# Patient Record
Sex: MALE | Race: WHITE | NOT HISPANIC OR LATINO
[De-identification: names, ages, dates, MRNs, and addresses within clinical notes are randomized per-mention and may not be internally consistent; named-entity substitution may affect disease eponyms.]

---

## 2021-02-05 ENCOUNTER — TRANSCRIPTION ENCOUNTER (OUTPATIENT)
Age: 56
End: 2021-02-05

## 2022-11-04 ENCOUNTER — APPOINTMENT (OUTPATIENT)
Dept: ORTHOPEDIC SURGERY | Facility: CLINIC | Age: 57
End: 2022-11-04

## 2022-11-04 VITALS
BODY MASS INDEX: 32.96 KG/M2 | HEART RATE: 89 BPM | SYSTOLIC BLOOD PRESSURE: 148 MMHG | WEIGHT: 210 LBS | HEIGHT: 67 IN | DIASTOLIC BLOOD PRESSURE: 93 MMHG

## 2022-11-04 DIAGNOSIS — M51.16 INTERVERTEBRAL DISC DISORDERS WITH RADICULOPATHY, LUMBAR REGION: ICD-10-CM

## 2022-11-04 DIAGNOSIS — Z78.9 OTHER SPECIFIED HEALTH STATUS: ICD-10-CM

## 2022-11-04 PROCEDURE — 72100 X-RAY EXAM L-S SPINE 2/3 VWS: CPT

## 2022-11-04 PROCEDURE — 99204 OFFICE O/P NEW MOD 45 MIN: CPT

## 2022-11-04 NOTE — PHYSICAL EXAM
[de-identified] : CONSTITUTIONAL: The patient is a very pleasant individual who is well-nourished and who appears stated age.\par PSYCHIATRIC: The patient is alert and oriented X 3 and in no apparent distress, and participates with orthopedic evaluation well.\par HEAD: Atraumatic and is nonsyndromic in appearance.\par EENT: No visible thyromegaly, EOMI.\par RESPIRATORY: Respiratory rate is regular, not dyspneic on examination.\par LYMPHATICS: There is no inguinal lymphadenopathy\par INTEGUMENTARY: Skin is clean, dry, and intact about the bilateral lower extremities and lumbar spine.\par VASCULAR: There is brisk capillary refill about the bilateral lower extremities.\par NEUROLOGIC: There are no pathologic reflexes. There is no decrease in sensation of the bilateral lower extremities on Wartenberg pinwheel examination. Deep tendon reflexes are well maintained at 2+/4 of the bilateral lower extremities and are symmetric..\par MUSCULOSKELETAL: There is no visible muscular atrophy. Manual motor strength is well maintained in the bilateral lower extremities. Range of motion of lumbar spine is well maintained. The patient ambulates in a non-myelopathic manner. Negative tension sign and straight leg raise bilaterally. Quad extension, ankle dorsiflexion, EHL, plantar flexion, and ankle eversion are well preserved. Normal secondary orthopaedic exam of bilateral hips, greater trochanteric area, knees and ankles exam is highlighted by mechanical low back pain with flexion and extension.  Neurogenic claudication begins to occur with patient standing greater than 1 minute in the office down the left leg. [de-identified] : Lumbar x-rays have been reviewed on today's date shows mild lumbar degenerative disc disease lordosis is maintained 2 views of the lumbar spine were obtained and reviewed on November 4, 2022\par \par Lumbar MRI done at [facility] radiology May 13, 2022 demonstrates L4-L5 moderate posterior disc herniation, extrusion left paracentral.  Moderate facet arthrosis and severe final canal stenosis at L4-L5.  L5-S1 decrease intervertebral disc space.

## 2022-11-04 NOTE — DISCUSSION/SUMMARY
[Surgical risks reviewed] : Surgical risks reviewed [de-identified] : Patient has failed conservative management for his back and leg pain in the form of multiple pain management injections last of which was 1 month ago did not help whatsoever as well as considerable physical therapy home exercises taking anti-inflammatories and Tylenol none of which is helping his pain.  A long discussion was had with the patient regarding Lumbar surgical plan of laminectomy L4, L5, possible posterior instrumented noninstrumented and interbody fusion L4-L5, L5-S1, plan to be finalized after CT scan of the lumbar spine results have been reviewed with the patient.  Patient should follow-up after CT of the lumbar spine is completed.  CT of the lumbar spine is ordered and is medically necessary to delineate levels needed to be included in fusion surgery to decrease back pain.  CT will also be used in order to measure implants and to establish if there is any change in gas formation about additional discs.. Anatomic models, Xrays, CT scans/MRI's were utilized to provide a firm understanding of their surgical plan. Patient is aware that surgery is elective in nature and he/she choosing to proceed with surgery. Risks including adjacent segment disease/need for further surgical management in the future, continued back pain, inability to return to work doing lifting and bending type activities, benefits including decreased pain score, alternatives were discussed and all questions, comments and concerns were encouraged and answered to the patient's satisfaction. The statistical probability of improvement was discussed at length as well as post surgical course. Literature from [employer] spine society was provided to the patient regarding the specific type of surgery as well as a 5 page written surgical consent which the patient will need to sign and return to the office prior to surgical date. Consent forms highlight specific complications related to the complex nature of spinal surgery\par  \par Risks of lumbar surgery include: persistent pain, adjacent segment disease (which will require more surgery in future), dural tears, neurologic injury, and wound healing complications\par  \par Benefits of lumbar surgery include Improved neurologic and pain score\par  \par We also discussed with the patient complications of incisions directly related to obesity, diabetes, previous wound complications or post-surgical wound infections, smoking, neuropathy, and chronic anticoagulation. This risk has been specifically discussed and the patient will discuss modifiable risk factors to be optimized prior to surgical management. A multimodality approach of primary care physician, and medicine subspecialist will be utilized to optimize medical risk factors.\par  \par Patient has stated he does not smoke cigarettes.\par Will return to office after her lumbar CT is completed to discuss final surgical planning.

## 2022-11-04 NOTE — HISTORY OF PRESENT ILLNESS
[de-identified] : 57-year-old male complains of low back pain equal to bilateral lower extremity pain.  Low back pain has been going on approximately 20 years has been worsening through time.  For the last year he has had additional complaint of bilateral lower extremity pain tingling numbness upon walking and standing.  He is unable to walk for greater than 30 minutes at a time.  He works as an  and also drives a truck during work.  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  He had physical therapy approximately 1 year ago for 3 months it did not help whatsoever to help his pain.  He has had multiple pain management injections including epidurals for trigger points and facet injections none of which have helped recently to relieve his pain.  Last injection was approximately 1 month ago did not help at all.  He states that when he started having injections 2 years ago they would help for 3 months or so.\par Medical surgical social family and allergy history was reviewed

## 2022-12-09 ENCOUNTER — APPOINTMENT (OUTPATIENT)
Dept: ORTHOPEDIC SURGERY | Facility: CLINIC | Age: 57
End: 2022-12-09

## 2022-12-09 VITALS
HEART RATE: 105 BPM | BODY MASS INDEX: 32.96 KG/M2 | HEIGHT: 67 IN | DIASTOLIC BLOOD PRESSURE: 85 MMHG | SYSTOLIC BLOOD PRESSURE: 124 MMHG | WEIGHT: 210 LBS

## 2022-12-09 DIAGNOSIS — M48.062 SPINAL STENOSIS, LUMBAR REGION WITH NEUROGENIC CLAUDICATION: ICD-10-CM

## 2022-12-09 DIAGNOSIS — M47.816 SPONDYLOSIS W/OUT MYELOPATHY OR RADICULOPATHY, LUMBAR REGION: ICD-10-CM

## 2022-12-09 PROCEDURE — 99215 OFFICE O/P EST HI 40 MIN: CPT

## 2023-01-04 ENCOUNTER — OUTPATIENT (OUTPATIENT)
Dept: OUTPATIENT SERVICES | Facility: HOSPITAL | Age: 58
LOS: 1 days | End: 2023-01-04
Payer: COMMERCIAL

## 2023-01-04 VITALS
HEART RATE: 79 BPM | TEMPERATURE: 98 F | OXYGEN SATURATION: 98 % | WEIGHT: 211.42 LBS | HEIGHT: 69 IN | RESPIRATION RATE: 17 BRPM | SYSTOLIC BLOOD PRESSURE: 120 MMHG | DIASTOLIC BLOOD PRESSURE: 80 MMHG

## 2023-01-04 DIAGNOSIS — Z91.89 OTHER SPECIFIED PERSONAL RISK FACTORS, NOT ELSEWHERE CLASSIFIED: ICD-10-CM

## 2023-01-04 DIAGNOSIS — M48.062 SPINAL STENOSIS, LUMBAR REGION WITH NEUROGENIC CLAUDICATION: ICD-10-CM

## 2023-01-04 DIAGNOSIS — Z98.890 OTHER SPECIFIED POSTPROCEDURAL STATES: Chronic | ICD-10-CM

## 2023-01-04 DIAGNOSIS — M47.816 SPONDYLOSIS WITHOUT MYELOPATHY OR RADICULOPATHY, LUMBAR REGION: ICD-10-CM

## 2023-01-04 DIAGNOSIS — F31.9 BIPOLAR DISORDER, UNSPECIFIED: ICD-10-CM

## 2023-01-04 DIAGNOSIS — Z01.818 ENCOUNTER FOR OTHER PREPROCEDURAL EXAMINATION: ICD-10-CM

## 2023-01-04 DIAGNOSIS — S68.119A COMPLETE TRAUMATIC METACARPOPHALANGEAL AMPUTATION OF UNSPECIFIED FINGER, INITIAL ENCOUNTER: Chronic | ICD-10-CM

## 2023-01-04 DIAGNOSIS — M48.00 SPINAL STENOSIS, SITE UNSPECIFIED: ICD-10-CM

## 2023-01-04 DIAGNOSIS — I10 ESSENTIAL (PRIMARY) HYPERTENSION: ICD-10-CM

## 2023-01-04 LAB
A1C WITH ESTIMATED AVERAGE GLUCOSE RESULT: 5.8 % — HIGH (ref 4–5.6)
ALBUMIN SERPL ELPH-MCNC: 4.6 G/DL — SIGNIFICANT CHANGE UP (ref 3.3–5.2)
ALP SERPL-CCNC: 60 U/L — SIGNIFICANT CHANGE UP (ref 40–120)
ALT FLD-CCNC: 26 U/L — SIGNIFICANT CHANGE UP
ANION GAP SERPL CALC-SCNC: 9 MMOL/L — SIGNIFICANT CHANGE UP (ref 5–17)
APTT BLD: 27.7 SEC — SIGNIFICANT CHANGE UP (ref 27.5–35.5)
AST SERPL-CCNC: 20 U/L — SIGNIFICANT CHANGE UP
BASOPHILS # BLD AUTO: 0.03 K/UL — SIGNIFICANT CHANGE UP (ref 0–0.2)
BASOPHILS NFR BLD AUTO: 0.5 % — SIGNIFICANT CHANGE UP (ref 0–2)
BILIRUB SERPL-MCNC: 0.4 MG/DL — SIGNIFICANT CHANGE UP (ref 0.4–2)
BLD GP AB SCN SERPL QL: SIGNIFICANT CHANGE UP
BUN SERPL-MCNC: 19.9 MG/DL — SIGNIFICANT CHANGE UP (ref 8–20)
CALCIUM SERPL-MCNC: 9.6 MG/DL — SIGNIFICANT CHANGE UP (ref 8.4–10.5)
CHLORIDE SERPL-SCNC: 102 MMOL/L — SIGNIFICANT CHANGE UP (ref 96–108)
CO2 SERPL-SCNC: 29 MMOL/L — SIGNIFICANT CHANGE UP (ref 22–29)
CREAT SERPL-MCNC: 1.22 MG/DL — SIGNIFICANT CHANGE UP (ref 0.5–1.3)
EGFR: 68 ML/MIN/1.73M2 — SIGNIFICANT CHANGE UP
EOSINOPHIL # BLD AUTO: 0.15 K/UL — SIGNIFICANT CHANGE UP (ref 0–0.5)
EOSINOPHIL NFR BLD AUTO: 2.3 % — SIGNIFICANT CHANGE UP (ref 0–6)
ESTIMATED AVERAGE GLUCOSE: 120 MG/DL — HIGH (ref 68–114)
GLUCOSE SERPL-MCNC: 106 MG/DL — HIGH (ref 70–99)
HCT VFR BLD CALC: 47.2 % — SIGNIFICANT CHANGE UP (ref 39–50)
HGB BLD-MCNC: 15.5 G/DL — SIGNIFICANT CHANGE UP (ref 13–17)
IMM GRANULOCYTES NFR BLD AUTO: 0.6 % — SIGNIFICANT CHANGE UP (ref 0–0.9)
INR BLD: 1.09 RATIO — SIGNIFICANT CHANGE UP (ref 0.88–1.16)
LYMPHOCYTES # BLD AUTO: 1.35 K/UL — SIGNIFICANT CHANGE UP (ref 1–3.3)
LYMPHOCYTES # BLD AUTO: 21.1 % — SIGNIFICANT CHANGE UP (ref 13–44)
MCHC RBC-ENTMCNC: 28.2 PG — SIGNIFICANT CHANGE UP (ref 27–34)
MCHC RBC-ENTMCNC: 32.8 GM/DL — SIGNIFICANT CHANGE UP (ref 32–36)
MCV RBC AUTO: 86 FL — SIGNIFICANT CHANGE UP (ref 80–100)
MONOCYTES # BLD AUTO: 0.57 K/UL — SIGNIFICANT CHANGE UP (ref 0–0.9)
MONOCYTES NFR BLD AUTO: 8.9 % — SIGNIFICANT CHANGE UP (ref 2–14)
MRSA PCR RESULT.: SIGNIFICANT CHANGE UP
NEUTROPHILS # BLD AUTO: 4.26 K/UL — SIGNIFICANT CHANGE UP (ref 1.8–7.4)
NEUTROPHILS NFR BLD AUTO: 66.6 % — SIGNIFICANT CHANGE UP (ref 43–77)
PLATELET # BLD AUTO: 309 K/UL — SIGNIFICANT CHANGE UP (ref 150–400)
POTASSIUM SERPL-MCNC: 3.8 MMOL/L — SIGNIFICANT CHANGE UP (ref 3.5–5.3)
POTASSIUM SERPL-SCNC: 3.8 MMOL/L — SIGNIFICANT CHANGE UP (ref 3.5–5.3)
PROT SERPL-MCNC: 7.3 G/DL — SIGNIFICANT CHANGE UP (ref 6.6–8.7)
PROTHROM AB SERPL-ACNC: 12.7 SEC — SIGNIFICANT CHANGE UP (ref 10.5–13.4)
RBC # BLD: 5.49 M/UL — SIGNIFICANT CHANGE UP (ref 4.2–5.8)
RBC # FLD: 12.4 % — SIGNIFICANT CHANGE UP (ref 10.3–14.5)
S AUREUS DNA NOSE QL NAA+PROBE: SIGNIFICANT CHANGE UP
SARS-COV-2 RNA SPEC QL NAA+PROBE: DETECTED
SODIUM SERPL-SCNC: 140 MMOL/L — SIGNIFICANT CHANGE UP (ref 135–145)
WBC # BLD: 6.4 K/UL — SIGNIFICANT CHANGE UP (ref 3.8–10.5)
WBC # FLD AUTO: 6.4 K/UL — SIGNIFICANT CHANGE UP (ref 3.8–10.5)

## 2023-01-04 PROCEDURE — G0463: CPT

## 2023-01-04 PROCEDURE — 93005 ELECTROCARDIOGRAM TRACING: CPT

## 2023-01-04 PROCEDURE — 93010 ELECTROCARDIOGRAM REPORT: CPT

## 2023-01-04 NOTE — H&P PST ADULT - MUSCULOSKELETAL
details… no calf tenderness/strength 5/5 bilateral upper extremities/strength 5/5 bilateral lower extremities/back exam

## 2023-01-04 NOTE — H&P PST ADULT - HISTORY OF PRESENT ILLNESS
56 y/o male with PMH HTN, HLD, anxiety, bipolar disorder, GERD and spinal stenosis presents to PST with complaints of low back pain x 20 years that is getting progressively worse. States he has been following with pain management, he has had [id number] and different pain medication with no relief. Pain is constant, described as throbbing, radiates down b/l legs, 8/10 in severity at its worst, worse with sitting, driving and in the morning, relieved with hot showers. Denies fevers, chills, nausea or loss of control of bladder or bowels. Patient is scheduled for L4-L5, L5-S1 instrumented fusion interbody and laminectomy L4-L5 on 1/23/23 with Dr. Reyez.  58 y/o male with PMH HTN, HLD, anxiety, bipolar disorder, GERD and spinal stenosis presents to PST with complaints of low back pain x 20 years that is getting progressively worse. States he has been following with pain management, he has had injections and different pain medication with no relief. Pain is constant, described as throbbing, radiates down b/l legs, 8/10 in severity at its worst, worse with sitting, driving and in the morning, relieved with hot showers. Denies fevers, chills, nausea or loss of control of bladder or bowels. Patient is scheduled for L4-L5, L5-S1 instrumented fusion interbody and laminectomy L4-L5 on 1/23/23 with Dr. Reyez.

## 2023-01-04 NOTE — H&P PST ADULT - NSICDXPASTMEDICALHX_GEN_ALL_CORE_FT
PAST MEDICAL HISTORY:  Anxiety     Bipolar disorder     GERD (gastroesophageal reflux disease)     HLD (hyperlipidemia)     HTN (hypertension)     Spinal stenosis

## 2023-01-04 NOTE — H&P PST ADULT - ASSESSMENT
Patient educated on surgical scrub, COVID testing, ERP protocol, Spine book, preadmission instructions, medical clearance, cardiology clearance and day of procedure medications, verbalizes understanding. Pt instructed to stop vitamins/supplements/herbal medications/ASA/NSAIDS for one week prior to surgery and discuss with PMD.    CAPRINI SCORE    AGE RELATED RISK FACTORS                                                             [ ] Age 41-60 years                                            (1 Point)  [ ] Age: 61-74 years                                           (2 Points)                 [ ] Age= 75 years                                                (3 Points)             DISEASE RELATED RISK FACTORS                                                       [ ] Edema in the lower extremities                 (1 Point)                     [ ] Varicose veins                                               (1 Point)                                 [ ] BMI > 25 Kg/m2                                            (1 Point)                                  [ ] Serious infection (ie PNA)                            (1 Point)                     [ ] Lung disease ( COPD, Emphysema)            (1 Point)                                                                          [ ] Acute myocardial infarction                         (1 Point)                  [ ] Congestive heart failure (in the previous month)  (1 Point)         [ ] Inflammatory bowel disease                            (1 Point)                  [ ] Central venous access, PICC or Port               (2 points)       (within the last month)                                                                [ ] Stroke (in the previous month)                        (5 Points)    [ ] Previous or present malignancy                       (2 points)                                                                                                                                                         HEMATOLOGY RELATED FACTORS                                                         [ ] Prior episodes of VTE                                     (3 Points)                     [ ] Positive family history for VTE                      (3 Points)                  [ ] Prothrombin 66278 A                                     (3 Points)                     [ ] Factor V Leiden                                                (3 Points)                        [ ] Lupus anticoagulants                                      (3 Points)                                                           [ ] Anticardiolipin antibodies                              (3 Points)                                                       [ ] High homocysteine in the blood                   (3 Points)                                             [ ] Other congenital or acquired thrombophilia      (3 Points)                                                [ ] Heparin induced thrombocytopenia                  (3 Points)                                        MOBILITY RELATED FACTORS  [ ] Bed rest                                                         (1 Point)  [ ] Plaster cast                                                    (2 points)  [ ] Bed bound for more than 72 hours           (2 Points)    GENDER SPECIFIC FACTORS  [ ] Pregnancy or had a baby within the last month   (1 Point)  [ ] Post-partum < 6 weeks                                   (1 Point)  [ ] Hormonal therapy  or oral contraception   (1 Point)  [ ] History of pregnancy complications              (1 point)  [ ] Unexplained or recurrent              (1 Point)    OTHER RISK FACTORS                                           (1 Point)  [ ] BMI >40, smoking, diabetes requiring insulin, chemotherapy  blood transfusions and length of surgery over 2 hours    SURGERY RELATED RISK FACTORS  [ ]  Section within the last month     (1 Point)  [ ] Minor surgery                                                  (1 Point)  [ ] Arthroscopic surgery                                       (2 Points)  [ ] Planned major surgery lasting more            (2 Points)      than 45 minutes     [ ] Elective hip or knee joint replacement       (5 points)       surgery                                                TRAUMA RELATED RISK FACTORS  [ ] Fracture of the hip, pelvis, or leg                       (5 Points)  [ ] Spinal cord injury resulting in paralysis             (5 points)       (in the previous month)    [ ] Paralysis  (less than 1 month)                             (5 Points)  [ ] Multiple Trauma within 1 month                        (5 Points)    Total Score [        ]    Caprini Score 0-2: Low Risk, NO VTE prophylaxis required for most patients, encourage ambulation  Caprini Score 3-6: Moderate Risk , pharmacologic VTE prophylaxis is indicated for most patients (in the absence of contraindications)  Caprini Score Greater than or =7: High risk, pharmocologic VTE prophylaxis indicated for most patients (in the absence of contraindications)    OPIOID RISK TOOL    AYAN EACH BOX THAT APPLIES AND ADD TOTALS AT THE END    FAMILY HISTORY OF SUBSTANCE ABUSE                 FEMALE         MALE                                                Alcohol                             [  ]1 pt          [  ]3pts                                               Illegal Durgs                     [  ]2 pts        [  ]3pts                                               Rx Drugs                           [  ]4 pts        [  ]4 pts    PERSONAL HISTORY OF SUBSTANCE ABUSE                                                                                          Alcohol                             [  ]3 pts       [  ]3 pts                                               Illegal Drugs                     [  ]4 pts        [  ]4 pts                                               Rx Drugs                           [  ]5 pts        [  ]5 pts    AGE BETWEEN 16-45 YEARS                                      [  ]1 pt         [  ]1 pt    HISTORY OF PREADOLESCENT   SEXUAL ABUSE                                                             [  ]3 pts        [  ]0pts    PSYCHOLOGICAL DISEASE                     ADD, OCD, Bipolar, Schizophrenia        [  ]2 pts         [  ]2 pts                      Depression                                               [  ]1 pt           [  ]1 pt           SCORING TOTAL   (add numbers and type here)              (***)                                     A score of 3 or lower indicated LOW risk for future opioid abuse  A score of 4 to 7 indicated moderate risk for future opioid abuse  A score of 8 or higher indicates a high risk for opioid abuse   56 y/o male with PMH HTN, HLD, anxiety, bipolar disorder, GERD and spinal stenosis presents to PST with complaints of low back pain x 20 years that is getting progressively worse. States he has been following with pain management, he has had injections and different pain medication with no relief. Pain is constant, described as throbbing, radiates down b/l legs, 8/10 in severity at its worst, worse with sitting, driving and in the morning, relieved with hot showers. Denies fevers, chills, nausea or loss of control of bladder or bowels. Patient is scheduled for L4-L5, L5-S1 instrumented fusion interbody and laminectomy L4-L5 on 23 with Dr. Reyez. Patient educated on surgical scrub, COVID testing, ERP protocol, Spine book, preadmission instructions, medical clearance, cardiology clearance and day of procedure medications, verbalizes understanding. Pt instructed to stop vitamins/supplements/herbal medications/ASA/NSAIDS for one week prior to surgery and discuss with PMD.    CAPRINI SCORE    AGE RELATED RISK FACTORS                                                             [ x] Age 41-60 years                                            (1 Point)  [ ] Age: 61-74 years                                           (2 Points)                 [ ] Age= 75 years                                                (3 Points)             DISEASE RELATED RISK FACTORS                                                       [ ] Edema in the lower extremities                 (1 Point)                     [ ] Varicose veins                                               (1 Point)                                 [x ] BMI > 25 Kg/m2                                            (1 Point)                                  [ ] Serious infection (ie PNA)                            (1 Point)                     [ ] Lung disease ( COPD, Emphysema)            (1 Point)                                                                          [ ] Acute myocardial infarction                         (1 Point)                  [ ] Congestive heart failure (in the previous month)  (1 Point)         [ ] Inflammatory bowel disease                            (1 Point)                  [ ] Central venous access, PICC or Port               (2 points)       (within the last month)                                                                [ ] Stroke (in the previous month)                        (5 Points)    [ ] Previous or present malignancy                       (2 points)                                                                                                                                                         HEMATOLOGY RELATED FACTORS                                                         [ ] Prior episodes of VTE                                     (3 Points)                     [ ] Positive family history for VTE                      (3 Points)                  [ ] Prothrombin 09107 A                                     (3 Points)                     [ ] Factor V Leiden                                                (3 Points)                        [ ] Lupus anticoagulants                                      (3 Points)                                                           [ ] Anticardiolipin antibodies                              (3 Points)                                                       [ ] High homocysteine in the blood                   (3 Points)                                             [ ] Other congenital or acquired thrombophilia      (3 Points)                                                [ ] Heparin induced thrombocytopenia                  (3 Points)                                        MOBILITY RELATED FACTORS  [ ] Bed rest                                                         (1 Point)  [ ] Plaster cast                                                    (2 points)  [ ] Bed bound for more than 72 hours           (2 Points)    GENDER SPECIFIC FACTORS  [ ] Pregnancy or had a baby within the last month   (1 Point)  [ ] Post-partum < 6 weeks                                   (1 Point)  [ ] Hormonal therapy  or oral contraception   (1 Point)  [ ] History of pregnancy complications              (1 point)  [ ] Unexplained or recurrent              (1 Point)    OTHER RISK FACTORS                                           (1 Point)  [x ] BMI >40, smoking, diabetes requiring insulin, chemotherapy  blood transfusions and length of surgery over 2 hours    SURGERY RELATED RISK FACTORS  [ ]  Section within the last month     (1 Point)  [ ] Minor surgery                                                  (1 Point)  [ ] Arthroscopic surgery                                       (2 Points)  [x ] Planned major surgery lasting more            (2 Points)      than 45 minutes     [ ] Elective hip or knee joint replacement       (5 points)       surgery                                                TRAUMA RELATED RISK FACTORS  [ ] Fracture of the hip, pelvis, or leg                       (5 Points)  [ ] Spinal cord injury resulting in paralysis             (5 points)       (in the previous month)    [ ] Paralysis  (less than 1 month)                             (5 Points)  [ ] Multiple Trauma within 1 month                        (5 Points)    Total Score [   5     ]    Caprini Score 0-2: Low Risk, NO VTE prophylaxis required for most patients, encourage ambulation  Caprini Score 3-6: Moderate Risk , pharmacologic VTE prophylaxis is indicated for most patients (in the absence of contraindications)  Caprini Score Greater than or =7: High risk, pharmocologic VTE prophylaxis indicated for most patients (in the absence of contraindications)    OPIOID RISK TOOL    AYAN EACH BOX THAT APPLIES AND ADD TOTALS AT THE END    FAMILY HISTORY OF SUBSTANCE ABUSE                 FEMALE         MALE                                                Alcohol                             [  ]1 pt          [  ]3pts                                               Illegal Durgs                     [  ]2 pts        [  ]3pts                                               Rx Drugs                           [  ]4 pts        [  ]4 pts    PERSONAL HISTORY OF SUBSTANCE ABUSE                                                                                          Alcohol                             [  ]3 pts       [x  ]3 pts                                               Illegal Drugs                     [  ]4 pts        [  ]4 pts                                               Rx Drugs                           [  ]5 pts        [  ]5 pts    AGE BETWEEN 16-45 YEARS                                      [  ]1 pt         [  ]1 pt    HISTORY OF PREADOLESCENT   SEXUAL ABUSE                                                             [  ]3 pts        [  ]0pts    PSYCHOLOGICAL DISEASE                     ADD, OCD, Bipolar, Schizophrenia        [  ]2 pts         [  x]2 pts                      Depression                                               [  ]1 pt           [  ]1 pt           SCORING TOTAL   (add numbers and type here)              (*5**)                                     A score of 3 or lower indicated LOW risk for future opioid abuse  A score of 4 to 7 indicated moderate risk for future opioid abuse  A score of 8 or higher indicates a high risk for opioid abuse

## 2023-01-04 NOTE — CHART NOTE - NSCHARTNOTEFT_GEN_A_CORE
Search Terms: david garcia, 1965Search Date: 01/04/2023 14:44:51 PM  The Drug Utilization Report below displays all of the controlled substance prescriptions, if any, that your patient has filled in the last twelve months. The information displayed on this report is compiled from pharmacy submissions to the Department, and accurately reflects the information as submitted by the pharmacies.    This report was requested by: Mervat Yadav | Reference #: [id number]    Others' Prescriptions  Patient Name: David GarciaBirth Date: 1965  Address: [address] [place] [address] [place], [place] [address]: Male  Rx Written	Rx Dispensed	Drug	Quantity	Days Supply	Prescriber Name  11/16/2022	12/10/2022	oxycodone-acetaminophen 5-325 mg tab	90	30	JarrodPancho pantojamood  11/16/2022	11/17/2022	pregabalin 75 mg capsule	90	30	Jarrod, Pancho Vanna  11/08/2022	11/10/2022	oxycodone-acetaminophen 5-325 mg tab	90	30	JarrodPancho Vanna  10/04/2022	10/06/2022	oxycodone-acetaminophen 5-325 mg tab	90	30	Jarrod, Pancho Headmood  08/31/2022	09/01/2022	oxycodone-acetaminophen 5-325 mg tab	90	30	Dwain Bowen D, MD  07/29/2022	07/31/2022	oxycodone-acetaminophen 5-325 mg tab	90	30	Dwain Bowen D, MD  06/30/2022	06/30/2022	oxycodone-acetaminophen 5-325 mg tab	90	30	Jarrod, Pancho Headmood  05/25/2022	05/26/2022	oxycodone-acetaminophen 5-325 mg tab	90	30	Dwain Bowen D, MD  04/19/2022	04/21/2022	oxycodone-acetaminophen 5-325 mg tab	90	30	JarrodPancho pantojamood  03/23/2022	03/24/2022	oxycodone-acetaminophen 5-325 mg tab	90	30	Jarrod, Pancho Headmood  02/22/2022	02/24/2022	oxycodone-acetaminophen 5-325 mg tablet	90	30	Pancho Garay  02/14/2022	02/15/2022	diazepam 5 mg tablet	2	1	Pancho Garay  01/11/2022	01/13/2022	oxycodone-acetaminophen 5-325 mg tab	90	30	Pancho Garay  * - Drugs marked with an asterisk are compound drugs. If the compound drug is made up of more than one controlled substance, then each controlled substance will be a separate row in the table.

## 2023-01-04 NOTE — H&P PST ADULT - ATTENDING COMMENTS
Attending statement:  I have personally seen this patient, and formed a face to face diagnostic evaluation on this patient on this date.  I have reviewed the PA, NP and or Medical/PA student and/or Resident documentation and agree with the history, physical exam and plan of care except if noted otherwise.      Patient presents for a two-level instrumented fusion at L4-5 and L5-S1.  Patient has exhausted conservative care physical therapy injections.  He has a 2 phase complaint first is neurogenic claudication secondary to severe spinal stenosis at 4 5 and moderate to severe at 5 1.  He is also complaining of intractable low back pain secondary to degenerative disc disease vacuum phenomena at 4 5 and 5 1 I have discussed the role of an instrumented fusion interbody's to address back pain as well as a lumbar laminectomy in a Guy fashion to address his neurogenic claudication based upon failed conservative care he wishes to continue with operative management.  Patient is aware of incomplete resolution of signs and symptoms adjacent segment disease revision surgery etc. 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  Based upon failed conservative care patient wishes to proceed

## 2023-01-04 NOTE — H&P PST ADULT - PROBLEM SELECTOR PLAN 1
medical clearance, cardiology clearance pending  Patient is scheduled for L4-L5, L5-S1 instrumented fusion interbody and laminectomy L4-L5 on 1/23/23 with Dr. Reyez.

## 2023-01-17 NOTE — HISTORY OF PRESENT ILLNESS
[Worsening] : worsening [___ yrs] : [unfilled] year(s) ago [10] : a maximum pain level of 10/10 [Constant] : ~He/She~ states the symptoms seem to be constant [Sitting] : worsened by sitting [Walking] : worsened by walking [None] : No relieving factors are noted [de-identified] : Patient presents for surgical conversation with regard to L4-5 and L5-S1 lumbar degenerative disc disease spinal stenosis patient states his low back pain and neurogenic claudication are equal I discussed openly with this patient that this would require a fusion to address back pain as well as a laminectomy to address his neurogenic claudication he again states this is an equal distribution of low back and leg pain.  And he would like both addressed patient states his signs and symptoms are worsening he states he is now taking medication first thing in the morning with intractable low back and sciatic type findings [Ataxia] : no ataxia [Incontinence] : no incontinence [Loss of Dexterity] : good dexterity [de-identified] : Minimal and transient

## 2023-01-17 NOTE — DISCUSSION/SUMMARY
[de-identified] : A long discussion was had with the patient regarding surgical plan. Patient is aware that surgery is elective in nature and is choosing to proceed with surgery. Risks, benefits, alternatives were discussed and all questions, comments and concerns were answered to the patient's satisfaction. The statistical probability of improvement was discussed at length as well as post surgical course.  Literature was provided regarding the specific type of surgery as well as a written surgical consent which the patient will need to sign and return to the office prior to surgical date.  \par If patient is a smoker, discontinuation of smoking was advised and must be accomplished 6-8 weeks prior to surgery date.  Patient was advised that help with quitting smoking is available through [facility] Smoker's Quit Line and phone number/website was provided, or patient can ask assistance from primary care provider.  Elective surgery will not be performed unless patient complies with this policy. \par \par Based upon patient's wish to improve his back pain and neurogenic claudication we are going to present a lumbar instrumented fusion L4-5 and L5-S1 to address back pain.  We also going to perform a laminectomy and a discectomy to help improve his neurogenic claudication.  Based upon the CAT scan results with nitrogen gas formation/clot formation interbody's will be utilized also to address the degenerative disc disease as well as address the foraminal stenosis.  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  A lumbar laminectomy in an isolated fashion will have a statistically higher chance of improving his return to work.\par \par New lumbar is ordered and is medically necessary to delineate any progression of lumbar spondylosis and stenosis, identify any anomalous anatomy that has occurred since last MRI was taken greater than 6 months ago.

## 2023-01-17 NOTE — PHYSICAL EXAM
[Antalgic] : antalgic [de-identified] : CONSTITUTIONAL: The patient is a very pleasant individual who is well-nourished and who appears stated age.\par PSYCHIATRIC: The patient is alert and oriented X 3 and in no apparent distress, and participates with orthopedic evaluation well.\par HEAD: Atraumatic and is nonsyndromic in appearance.\par EENT: No visible thyromegaly, EOMI.\par RESPIRATORY: Respiratory rate is regular, not dyspneic on examination.\par LYMPHATICS: There is no inguinal lymphadenopathy\par INTEGUMENTARY: Skin is clean, dry, and intact about the bilateral lower extremities and lumbar spine.\par VASCULAR: There is brisk capillary refill about the bilateral lower extremities.\par NEUROLOGIC: There are no pathologic reflexes. There is no decrease in sensation of the bilateral lower extremities on Wartenberg pinwheel examination. Deep tendon reflexes are well maintained at 2+/4 of the bilateral lower extremities and are symmetric.  Neurogenic claudication signs and symptoms are present\par MUSCULOSKELETAL: There is no visible muscular atrophy. Manual motor strength is well maintained in the bilateral lower extremities. Range of motion of lumbar spine is well maintained to approximately 50% with apprehension. The patient ambulates in a non-myelopathic manner however antalgic.  Positive tension sign and straight leg raise bilaterally. Quad extension, ankle dorsiflexion, EHL, plantar flexion, and ankle eversion are well preserved. Normal secondary orthopaedic exam of bilateral hips, greater trochanteric area, knees and ankles [de-identified] : Lumbar CAT scans MRIs from [facility] have been reviewed CAT scan shows nitrogen gas formation/clot formation within 4 5 and 5 1.  Lumbar MRI demonstrates severe spinal stenosis at 4 5 and associated lumbar degenerative disc disease at 4 5 and 5 1.  X-rays demonstrate lumbar spondylosis at 4 5 and 5 1

## 2023-01-18 ENCOUNTER — APPOINTMENT (OUTPATIENT)
Dept: MRI IMAGING | Facility: CLINIC | Age: 58
End: 2023-01-18
Payer: COMMERCIAL

## 2023-01-18 PROBLEM — E78.5 HYPERLIPIDEMIA, UNSPECIFIED: Chronic | Status: ACTIVE | Noted: 2023-01-04

## 2023-01-18 PROBLEM — F31.9 BIPOLAR DISORDER, UNSPECIFIED: Chronic | Status: ACTIVE | Noted: 2023-01-04

## 2023-01-18 PROBLEM — M48.00 SPINAL STENOSIS, SITE UNSPECIFIED: Chronic | Status: ACTIVE | Noted: 2023-01-04

## 2023-01-18 PROBLEM — I10 ESSENTIAL (PRIMARY) HYPERTENSION: Chronic | Status: ACTIVE | Noted: 2023-01-04

## 2023-01-18 PROBLEM — F41.9 ANXIETY DISORDER, UNSPECIFIED: Chronic | Status: ACTIVE | Noted: 2023-01-04

## 2023-01-18 PROBLEM — K21.9 GASTRO-ESOPHAGEAL REFLUX DISEASE WITHOUT ESOPHAGITIS: Chronic | Status: ACTIVE | Noted: 2023-01-04

## 2023-01-18 PROCEDURE — 72148 MRI LUMBAR SPINE W/O DYE: CPT

## 2023-01-22 ENCOUNTER — TRANSCRIPTION ENCOUNTER (OUTPATIENT)
Age: 58
End: 2023-01-22

## 2023-01-23 ENCOUNTER — TRANSCRIPTION ENCOUNTER (OUTPATIENT)
Age: 58
End: 2023-01-23

## 2023-01-23 ENCOUNTER — INPATIENT (INPATIENT)
Facility: HOSPITAL | Age: 58
LOS: 2 days | Discharge: HOME | DRG: 455 | End: 2023-01-26
Attending: ORTHOPAEDIC SURGERY | Admitting: ORTHOPAEDIC SURGERY
Payer: COMMERCIAL

## 2023-01-23 ENCOUNTER — APPOINTMENT (OUTPATIENT)
Dept: ORTHOPEDIC SURGERY | Facility: HOSPITAL | Age: 58
End: 2023-01-23

## 2023-01-23 VITALS
TEMPERATURE: 98 F | OXYGEN SATURATION: 100 % | DIASTOLIC BLOOD PRESSURE: 49 MMHG | RESPIRATION RATE: 16 BRPM | HEART RATE: 98 BPM | SYSTOLIC BLOOD PRESSURE: 95 MMHG

## 2023-01-23 DIAGNOSIS — M47.816 SPONDYLOSIS WITHOUT MYELOPATHY OR RADICULOPATHY, LUMBAR REGION: ICD-10-CM

## 2023-01-23 DIAGNOSIS — Z98.890 OTHER SPECIFIED POSTPROCEDURAL STATES: Chronic | ICD-10-CM

## 2023-01-23 DIAGNOSIS — S68.119A COMPLETE TRAUMATIC METACARPOPHALANGEAL AMPUTATION OF UNSPECIFIED FINGER, INITIAL ENCOUNTER: Chronic | ICD-10-CM

## 2023-01-23 LAB
ABO RH CONFIRMATION: SIGNIFICANT CHANGE UP
GLUCOSE BLDC GLUCOMTR-MCNC: 127 MG/DL — HIGH (ref 70–99)
GLUCOSE BLDC GLUCOMTR-MCNC: 130 MG/DL — HIGH (ref 70–99)
GLUCOSE BLDC GLUCOMTR-MCNC: 168 MG/DL — HIGH (ref 70–99)

## 2023-01-23 PROCEDURE — 99222 1ST HOSP IP/OBS MODERATE 55: CPT

## 2023-01-23 PROCEDURE — 63030 LAMOT DCMPRN NRV RT 1 LMBR: CPT | Mod: AS,50,59

## 2023-01-23 PROCEDURE — 63052 LAM FACETC/FRMT ARTHRD LUM 1: CPT

## 2023-01-23 PROCEDURE — 22633 ARTHRD CMBN 1NTRSPC LUMBAR: CPT | Mod: AS

## 2023-01-23 PROCEDURE — 22842 INSERT SPINE FIXATION DEVICE: CPT

## 2023-01-23 PROCEDURE — 22853 INSJ BIOMECHANICAL DEVICE: CPT | Mod: AS

## 2023-01-23 PROCEDURE — 63053 LAM FACTC/FRMT ARTHRD LUM EA: CPT | Mod: AS

## 2023-01-23 PROCEDURE — 22634 ARTHRD CMBN 1NTRSPC EA ADDL: CPT

## 2023-01-23 PROCEDURE — 22633 ARTHRD CMBN 1NTRSPC LUMBAR: CPT

## 2023-01-23 PROCEDURE — 63053 LAM FACTC/FRMT ARTHRD LUM EA: CPT

## 2023-01-23 PROCEDURE — 22842 INSERT SPINE FIXATION DEVICE: CPT | Mod: AS

## 2023-01-23 PROCEDURE — 63030 LAMOT DCMPRN NRV RT 1 LMBR: CPT | Mod: 50,59

## 2023-01-23 PROCEDURE — 22853 INSJ BIOMECHANICAL DEVICE: CPT

## 2023-01-23 PROCEDURE — 22634 ARTHRD CMBN 1NTRSPC EA ADDL: CPT | Mod: AS

## 2023-01-23 PROCEDURE — 63052 LAM FACETC/FRMT ARTHRD LUM 1: CPT | Mod: AS

## 2023-01-23 DEVICE — FLOSEAL FAST PREP 5ML: Type: IMPLANTABLE DEVICE | Status: FUNCTIONAL

## 2023-01-23 DEVICE — STRYKER LITE BIO DELIVERY SYSTEM WITH CANNULA: Type: IMPLANTABLE DEVICE | Status: FUNCTIONAL

## 2023-01-23 DEVICE — GRAFT VITOSIS BIMODAL 10CC: Type: IMPLANTABLE DEVICE | Status: FUNCTIONAL

## 2023-01-23 DEVICE — SCREW BLOCKER BONE XIA: Type: IMPLANTABLE DEVICE | Status: FUNCTIONAL

## 2023-01-23 DEVICE — ROD 70MM: Type: IMPLANTABLE DEVICE | Status: FUNCTIONAL

## 2023-01-23 DEVICE — SCREW SERRATO 6.5X45MM: Type: IMPLANTABLE DEVICE | Status: FUNCTIONAL

## 2023-01-23 DEVICE — SCREW POLY 6.5X50MM: Type: IMPLANTABLE DEVICE | Status: FUNCTIONAL

## 2023-01-23 DEVICE — SPACER PROLIFT EXPAND POST 15 DEG 10X28X8-13MM: Type: IMPLANTABLE DEVICE | Status: FUNCTIONAL

## 2023-01-23 DEVICE — SPACER PROLIFT EXPAND POST 12 DEG 10X28X8-13MM: Type: IMPLANTABLE DEVICE | Status: FUNCTIONAL

## 2023-01-23 DEVICE — CELLERATE SURGICAL POWDER RX 5GM: Type: IMPLANTABLE DEVICE | Status: FUNCTIONAL

## 2023-01-23 RX ORDER — CEFAZOLIN SODIUM 1 G
2000 VIAL (EA) INJECTION
Refills: 0 | Status: COMPLETED | OUTPATIENT
Start: 2023-01-23 | End: 2023-01-24

## 2023-01-23 RX ORDER — HYDROMORPHONE HYDROCHLORIDE 2 MG/ML
0.5 INJECTION INTRAMUSCULAR; INTRAVENOUS; SUBCUTANEOUS
Refills: 0 | Status: DISCONTINUED | OUTPATIENT
Start: 2023-01-23 | End: 2023-01-23

## 2023-01-23 RX ORDER — CARIPRAZINE 1.5 MG/1
1 CAPSULE, GELATIN COATED ORAL
Qty: 0 | Refills: 0 | DISCHARGE

## 2023-01-23 RX ORDER — VENLAFAXINE HCL 75 MG
1 CAPSULE, EXT RELEASE 24 HR ORAL
Qty: 0 | Refills: 0 | DISCHARGE

## 2023-01-23 RX ORDER — SODIUM CHLORIDE 9 MG/ML
1000 INJECTION, SOLUTION INTRAVENOUS
Refills: 0 | Status: DISCONTINUED | OUTPATIENT
Start: 2023-01-23 | End: 2023-01-23

## 2023-01-23 RX ORDER — GLUCAGON INJECTION, SOLUTION 0.5 MG/.1ML
1 INJECTION, SOLUTION SUBCUTANEOUS ONCE
Refills: 0 | Status: DISCONTINUED | OUTPATIENT
Start: 2023-01-23 | End: 2023-01-23

## 2023-01-23 RX ORDER — ATORVASTATIN CALCIUM 80 MG/1
40 TABLET, FILM COATED ORAL AT BEDTIME
Refills: 0 | Status: DISCONTINUED | OUTPATIENT
Start: 2023-01-23 | End: 2023-01-26

## 2023-01-23 RX ORDER — OMEPRAZOLE 10 MG/1
1 CAPSULE, DELAYED RELEASE ORAL
Qty: 0 | Refills: 0 | DISCHARGE

## 2023-01-23 RX ORDER — HYDROMORPHONE HYDROCHLORIDE 2 MG/ML
4 INJECTION INTRAMUSCULAR; INTRAVENOUS; SUBCUTANEOUS
Refills: 0 | Status: DISCONTINUED | OUTPATIENT
Start: 2023-01-23 | End: 2023-01-26

## 2023-01-23 RX ORDER — INSULIN LISPRO 100/ML
VIAL (ML) SUBCUTANEOUS
Refills: 0 | Status: DISCONTINUED | OUTPATIENT
Start: 2023-01-23 | End: 2023-01-26

## 2023-01-23 RX ORDER — ACETAMINOPHEN 500 MG
975 TABLET ORAL EVERY 8 HOURS
Refills: 0 | Status: DISCONTINUED | OUTPATIENT
Start: 2023-01-23 | End: 2023-01-26

## 2023-01-23 RX ORDER — DEXTROSE 50 % IN WATER 50 %
12.5 SYRINGE (ML) INTRAVENOUS ONCE
Refills: 0 | Status: DISCONTINUED | OUTPATIENT
Start: 2023-01-23 | End: 2023-01-23

## 2023-01-23 RX ORDER — ASPIRIN/CALCIUM CARB/MAGNESIUM 324 MG
325 TABLET ORAL DAILY
Refills: 0 | Status: DISCONTINUED | OUTPATIENT
Start: 2023-01-24 | End: 2023-01-26

## 2023-01-23 RX ORDER — ONDANSETRON 8 MG/1
4 TABLET, FILM COATED ORAL ONCE
Refills: 0 | Status: DISCONTINUED | OUTPATIENT
Start: 2023-01-23 | End: 2023-01-23

## 2023-01-23 RX ORDER — SODIUM CHLORIDE 9 MG/ML
1000 INJECTION, SOLUTION INTRAVENOUS
Refills: 0 | Status: DISCONTINUED | OUTPATIENT
Start: 2023-01-23 | End: 2023-01-26

## 2023-01-23 RX ORDER — ENALAPRIL MALEATE AND HYDROCHLOROTHIAZIDE 10; 25 MG/1; MG/1
1 TABLET ORAL
Qty: 0 | Refills: 0 | DISCHARGE

## 2023-01-23 RX ORDER — ATORVASTATIN CALCIUM 80 MG/1
1 TABLET, FILM COATED ORAL
Qty: 0 | Refills: 0 | DISCHARGE

## 2023-01-23 RX ORDER — METHOCARBAMOL 500 MG/1
750 TABLET, FILM COATED ORAL EVERY 8 HOURS
Refills: 0 | Status: DISCONTINUED | OUTPATIENT
Start: 2023-01-23 | End: 2023-01-26

## 2023-01-23 RX ORDER — DEXTROSE 50 % IN WATER 50 %
25 SYRINGE (ML) INTRAVENOUS ONCE
Refills: 0 | Status: DISCONTINUED | OUTPATIENT
Start: 2023-01-23 | End: 2023-01-23

## 2023-01-23 RX ORDER — OXYCODONE HYDROCHLORIDE 5 MG/1
10 TABLET ORAL
Refills: 0 | Status: DISCONTINUED | OUTPATIENT
Start: 2023-01-23 | End: 2023-01-26

## 2023-01-23 RX ORDER — SEMAGLUTIDE 0.68 MG/ML
0 INJECTION, SOLUTION SUBCUTANEOUS
Qty: 0 | Refills: 0 | DISCHARGE

## 2023-01-23 RX ORDER — SENNA PLUS 8.6 MG/1
2 TABLET ORAL AT BEDTIME
Refills: 0 | Status: DISCONTINUED | OUTPATIENT
Start: 2023-01-23 | End: 2023-01-26

## 2023-01-23 RX ORDER — SODIUM CHLORIDE 9 MG/ML
1000 INJECTION, SOLUTION INTRAVENOUS ONCE
Refills: 0 | Status: COMPLETED | OUTPATIENT
Start: 2023-01-23 | End: 2023-01-23

## 2023-01-23 RX ORDER — ONDANSETRON 8 MG/1
4 TABLET, FILM COATED ORAL EVERY 6 HOURS
Refills: 0 | Status: DISCONTINUED | OUTPATIENT
Start: 2023-01-23 | End: 2023-01-26

## 2023-01-23 RX ORDER — OXYCODONE HYDROCHLORIDE 5 MG/1
5 TABLET ORAL
Refills: 0 | Status: DISCONTINUED | OUTPATIENT
Start: 2023-01-23 | End: 2023-01-26

## 2023-01-23 RX ORDER — ONDANSETRON 8 MG/1
1 TABLET, FILM COATED ORAL
Qty: 0 | Refills: 0 | DISCHARGE

## 2023-01-23 RX ORDER — VENLAFAXINE HCL 75 MG
150 CAPSULE, EXT RELEASE 24 HR ORAL DAILY
Refills: 0 | Status: DISCONTINUED | OUTPATIENT
Start: 2023-01-23 | End: 2023-01-26

## 2023-01-23 RX ORDER — DEXTROSE 50 % IN WATER 50 %
15 SYRINGE (ML) INTRAVENOUS ONCE
Refills: 0 | Status: DISCONTINUED | OUTPATIENT
Start: 2023-01-23 | End: 2023-01-23

## 2023-01-23 RX ORDER — MAGNESIUM HYDROXIDE 400 MG/1
30 TABLET, CHEWABLE ORAL EVERY 12 HOURS
Refills: 0 | Status: DISCONTINUED | OUTPATIENT
Start: 2023-01-23 | End: 2023-01-26

## 2023-01-23 RX ADMIN — OXYCODONE HYDROCHLORIDE 10 MILLIGRAM(S): 5 TABLET ORAL at 23:20

## 2023-01-23 RX ADMIN — ATORVASTATIN CALCIUM 40 MILLIGRAM(S): 80 TABLET, FILM COATED ORAL at 22:15

## 2023-01-23 RX ADMIN — SENNA PLUS 2 TABLET(S): 8.6 TABLET ORAL at 22:16

## 2023-01-23 RX ADMIN — OXYCODONE HYDROCHLORIDE 10 MILLIGRAM(S): 5 TABLET ORAL at 17:21

## 2023-01-23 RX ADMIN — OXYCODONE HYDROCHLORIDE 10 MILLIGRAM(S): 5 TABLET ORAL at 22:20

## 2023-01-23 RX ADMIN — OXYCODONE HYDROCHLORIDE 10 MILLIGRAM(S): 5 TABLET ORAL at 18:20

## 2023-01-23 RX ADMIN — Medication 2000 MILLIGRAM(S): at 18:07

## 2023-01-23 RX ADMIN — Medication 975 MILLIGRAM(S): at 23:16

## 2023-01-23 RX ADMIN — Medication 975 MILLIGRAM(S): at 22:16

## 2023-01-23 RX ADMIN — SODIUM CHLORIDE 125 MILLILITER(S): 9 INJECTION, SOLUTION INTRAVENOUS at 17:20

## 2023-01-23 RX ADMIN — SODIUM CHLORIDE 125 MILLILITER(S): 9 INJECTION, SOLUTION INTRAVENOUS at 22:15

## 2023-01-23 RX ADMIN — SODIUM CHLORIDE 2000 MILLILITER(S): 9 INJECTION, SOLUTION INTRAVENOUS at 13:10

## 2023-01-23 RX ADMIN — Medication 2: at 17:28

## 2023-01-23 RX ADMIN — Medication 150 MILLIGRAM(S): at 18:08

## 2023-01-23 RX ADMIN — METHOCARBAMOL 750 MILLIGRAM(S): 500 TABLET, FILM COATED ORAL at 13:28

## 2023-01-23 NOTE — DISCHARGE NOTE PROVIDER - HOSPITAL COURSE
Patient was admitted for elective posterior lumbar laminectomy on 1/23/23 for failed conservative treatment of persistent lower back pain, The hospital post operative course was uneventful.  The surgical dressing was changed and the drain was removed uneventfully.   PT/OT worked with patient for gait training.  A TLSO brace was fitted for patient and was used by the patient when out of bed for comfort. Pain was now adequately controlled and patient was discharged home in stable condition.  Chronic medical conditions were treated during the hospital stay.

## 2023-01-23 NOTE — PHYSICAL THERAPY INITIAL EVALUATION ADULT - ADDITIONAL COMMENTS
Pt reports living with spouse in a house with 2 KATIE c rail, and bedroom on the 2nd level with 12 steps c rail. Pt amb without device and is independent with functional mobility, ADLs, and IADLs. Pt drives and is currently working. Pt has support of family and friends. Pt owns no DME.

## 2023-01-23 NOTE — DISCHARGE NOTE PROVIDER - NSDCMRMEDTOKEN_GEN_ALL_CORE_FT
atorvastatin 40 mg oral tablet: 1 tab(s) orally once a day  diclofenac potassium 50 mg oral tablet: 1 tab(s) orally 2 times a day  enalapril-hydrochlorothiazide 10 mg-25 mg oral tablet: 1 tab(s) orally once a day  omeprazole 20 mg oral delayed release capsule: 1 cap(s) orally once a day  ondansetron 4 mg oral tablet: 1 tab(s) orally every 8 hours, As Needed  oxycodone-acetaminophen 5 mg-325 mg oral tablet: 1 tab(s) orally every 8 hours, As Needed  Ozempic 2 mg/1.5 mL (0.25 mg or 0.5 mg dose) subcutaneous solution: subcutaneous once a week  venlafaxine 150 mg oral tablet, extended release: 1 tab(s) orally once a day  Vraylar 3 mg oral capsule: 1 cap(s) orally once a day   acetaminophen 325 mg oral capsule: 2 cap(s) orally every 6 hours  for mild to moderate pain. Do not exceed 4000 mg in 24 hours.  aspirin 325 mg oral delayed release tablet: 1 tab(s) orally once a day (with meals)   atorvastatin 40 mg oral tablet: 1 tab(s) orally once a day  enalapril-hydrochlorothiazide 10 mg-25 mg oral tablet: 1 tab(s) orally once a day  methocarbamol 750 mg oral tablet: 1 tab(s) orally every 8 hours, As needed for Muscle Spasms.  omeprazole 20 mg oral delayed release capsule: 1 cap(s) orally once a day  ondansetron 4 mg oral tablet: 1 tab(s) orally every 8 hours, As Needed  oxyCODONE 5 mg oral tablet: 1 tab(s) orally every 6 hours, As Needed  for severe pain. MDD:4  Ozempic 2 mg/1.5 mL (0.25 mg or 0.5 mg dose) subcutaneous solution: subcutaneous once a week  Senna S 50 mg-8.6 mg oral tablet: 2 tab(s) orally once a day (at bedtime) .  venlafaxine 150 mg oral tablet, extended release: 1 tab(s) orally once a day  Vraylar 3 mg oral capsule: 1 cap(s) orally once a day

## 2023-01-23 NOTE — CONSULT NOTE ADULT - ASSESSMENT
57M  chronic pain on perocet-5 prn  s/p Lumbar fusion using cage 23-Jan-2023     HYDROmorphone   Tablet 4 milliGRAM(s) Oral every 3 hours PRN  methocarbamol 750 milliGRAM(s) Oral every 8 hours PRN  oxyCODONE    IR 5 milliGRAM(s) Oral every 3 hours PRN  oxyCODONE    IR 10 milliGRAM(s) Oral every 3 hours PRN    if pain becomes uncontrolled:  - If no contraindication to NSAIDs, recommend starting ketorolac IV 30mg [id number] standing x 4 doses. Afterwards, can use celebrex 200mg PO q12h x 7days, with food. HOLD for black/red stools.   - Recommend starting hydromorphone IVP 0.5mg q4hour PRN breakthrough pain

## 2023-01-23 NOTE — PATIENT PROFILE ADULT - FALL HARM RISK - HARM RISK INTERVENTIONS
Assistance with ambulation/Assistance OOB with selected safe patient handling equipment/Communicate Risk of Fall with Harm to all staff/Discuss with provider need for PT consult/Monitor gait and stability/Provide patient with walking aids - walker, cane, crutches/Reinforce activity limits and safety measures with patient and family/Sit up slowly, dangle for a short time, stand at bedside before walking/Tailored Fall Risk Interventions/Use of alarms - bed, chair and/or voice tab/Visual Cue: Yellow wristband and red socks/Bed in lowest position, wheels locked, appropriate side rails in place/Call bell, personal items and telephone in reach/Instruct patient to call for assistance before getting out of bed or chair/Non-slip footwear when patient is out of bed/[place] to call system/Physically safe environment - no spills, clutter or unnecessary equipment/Purposeful Proactive Rounding/Room/bathroom lighting operational, light cord in reach

## 2023-01-23 NOTE — DISCHARGE NOTE PROVIDER - NSDCFUADDINST_GEN_ALL_CORE_FT
Follow-up with Dr. Reyez within 7-10 days. Dressing change daily until dry, then leave dressing in place until office follow-up.  Showering at 48 hour is permitted.  Pain medications as indicated and titrated to patient's needs. Patient will begin aspirin 325mg once daily for 4 weeks post-operatively for clot prevention.  LSO brace as needed for comfort when out of bed. Ambulate with assistance of walker. Contact office if the wound becomes red, opens or discharge increases.  Please make an appointment for follow up with your surgeon in 1-2 weeks. Call to schedule an appointment. Staples or stitches will be removed at your follow up appointment. Keep incision site clean and dry. No creams, lotions, or ointments to incision area. You are cleared to shower 3 days after surgery unless otherwise instructed.    Take pain medication as prescribed after surgery for pain control. Contact your surgeon's office if pain increases while taking prescribed pain medications or if related concerns develop. If you are taking narcotic pain medications, take a stool softener with it to prevent narcotic associated constipation. Additionally, increase water intake (drink at least 8 glasses daily) and add fiber to your diet by eating fruits, vegetables and foods that are rich in grains.     Do NOT take NSAIDs, including ibuprofen, Advil, Aleve, and Motrin for at least 3 months after your surgery as these medications can impact your healing.    NO heavy lifting, strenuous activity, twisting, bending, driving, or working until cleared by your surgeon  Contact your surgeon's office immediately for any of the following: fever, bleeding, new onset numbness/tingling/weakness, nausea and/or vomiting, urinary and/or fecal incontinence or retention. If an emergency occurs (chest pain, shortness of breath, new confusion, seizure, altered mental status, or other), call 911 and go to the emergency department for evaluation.

## 2023-01-23 NOTE — DISCHARGE NOTE PROVIDER - NSDCFUSCHEDAPPT_GEN_ALL_CORE_FT
Rick Reyez Physician [facility]  ORTHOSURG [address]  Scheduled Appointment: 02/02/2023

## 2023-01-23 NOTE — DISCHARGE NOTE PROVIDER - CARE PROVIDER_API CALL
Rick Reyez (DO)  Orthopaedic Surgery  [address], [place] [id number]  Phone: [phone]  Fax: [phone]  Follow Up Time:

## 2023-01-23 NOTE — CONSULT NOTE ADULT - ASSESSMENT
56 y/o male with PMH HTN, HLD, anxiety, bipolar disorder, GERD and spinal stenosis presents to PST with complaints of low back pain x 20 years that is getting progressively worse. States he has been following with pain management, he has had injections and different pain medication with no relief. Pain is constant, described as throbbing, radiates down b/l legs, 8/10 in severity at its worst, worse with sitting, driving and in the morning, relieved with hot showers.    1. Chronic low back pain with radiculopathy due to lumbar spondylosis ,   s/p lumbar fusin using cage , POD#0  PT/OT/pain mgmt  DVT prophylaxis- as per ortho  Abx as per SCIP  Incentive spirometry  Prophylaxis of opioid  induced constipation.  Wound care / HV as per ortho ,   monitor output    2. HTN - BP postop on lower side , continue ivf ,   hold Valsartan / HCTZ for now , monitor - patient is asymptomatic    3. HLD - continue Atorvastatin 40 mg QHS    4. GERD - continue Protonix     5. Hx of Bipolar/ Anxiety - continue Venlafaxine 150 mg daily / Vraylar 3 mg daily -   if not formulary may use own     6. Patient takes Ozempic for weigh loss - lost 8 lb  in 4 wks - may continue on discharge     7. Chronic pain due to low back pain , on chronic opioid use,   pain mgmt appreciated     8. DVT prophylaxis  - as per ortho protocol  Opioid induced constipation  prophylaxis - bowel regimen     Periop Keen + , TOV in am     Thank you for the courtesy of this consult ,   will follow along with you

## 2023-01-23 NOTE — CONSULT NOTE ADULT - SUBJECTIVE AND OBJECTIVE BOX
Patient is a 57y old  Male who presents with a chief complaint of " Back pain " (04 Jan 2023 13:24)      HPI:  56 y/o male with PMH HTN, HLD, anxiety, bipolar disorder, GERD and spinal stenosis presents to PST with complaints of low back pain x 20 years that is getting progressively worse. States he has been following with pain management, he has had injections and different pain medication with no relief. Pain is constant, described as throbbing, radiates down b/l legs, 8/10 in severity at its worst, worse with sitting, driving and in the morning, relieved with hot showers. Denies fevers, chills, nausea or loss of control of bladder or bowels. Patient is scheduled for L4-L5, L5-S1 instrumented fusion interbody and laminectomy L4-L5 on 1/23/23 with Dr. Reyez.  (04 Jan 2023 13:24)        Interval Hx:  Patient seen during rounds in pacu  Patient reports pain to be controlled on current medications  Patient denies sedation with medications     Analgesic Dosing for past 24 hours reviewed as below:    methocarbamol   750 milliGRAM(s) Oral (01-23-23 @ 13:28)          T(C): 36.6 (01-23-23 @ 14:00), Max: 36.6 (01-23-23 @ 13:30)  HR: 90 (01-23-23 @ 14:00) (90 - 105)  BP: 117/68 (01-23-23 @ 14:00) (92/45 - 117/68)  RR: 16 (01-23-23 @ 14:00) (14 - 17)  SpO2: 100% (01-23-23 @ 14:00) (97% - 100%)      01-23-23 @ 07:01  -  01-23-23 @ 14:23  --------------------------------------------------------  IN: 1390 mL / OUT: 245 mL / NET: 1145 mL        ceFAZolin  Injectable. 2000 milliGRAM(s) IV Push <User Schedule>  HYDROmorphone   Tablet 4 milliGRAM(s) Oral every 3 hours PRN  methocarbamol 750 milliGRAM(s) Oral every 8 hours PRN  oxyCODONE    IR 5 milliGRAM(s) Oral every 3 hours PRN  oxyCODONE    IR 10 milliGRAM(s) Oral every 3 hours PRN                  Pain Service   [phone]
    Patient is a 57y old  Male who is s/p lumbar fusion using cage , POD #0 . Seen and examined on med/surg floor ,   tolerated [procedure well .     CC: chronic low back pain with b/l radiculopathy       HPI:  58 y/o male with PMH HTN, HLD, anxiety, bipolar disorder, GERD and spinal stenosis presents to PST with complaints of low back pain x 20 years that is getting progressively worse. States he has been following with pain management, he has had injections and different pain medication with no relief. Pain is constant, described as throbbing, radiates down b/l legs, 8/10 in severity at its worst, worse with sitting, driving and in the morning, relieved with hot showers.    PAST MEDICAL & SURGICAL HISTORY:  HTN (hypertension)      HLD (hyperlipidemia)      GERD (gastroesophageal reflux disease)      Bipolar disorder      Anxiety      Spinal stenosis      H/O carpal tunnel repair      Amputation of finger tip          Social History:  Tobacco - denies   ETOH - drinks 2-3 beers on [place]   Illicit drug abuse - denies    FAMILY HISTORY:  FH: alcoholism (Father)        Allergies    No Known Allergies    Intolerances        HOME MEDICATIONS :     atorvastatin 40 mg oral tablet: 1 tab(s) orally once a day (23 Jan 2023 07:01)  diclofenac potassium 50 mg oral tablet: 1 tab(s) orally 2 times a day (23 Jan 2023 07:01)  enalapril-hydrochlorothiazide 10 mg-25 mg oral tablet: 1 tab(s) orally once a day (23 Jan 2023 07:01)  omeprazole 20 mg oral delayed release capsule: 1 cap(s) orally once a day (23 Jan 2023 07:01)  ondansetron 4 mg oral tablet: 1 tab(s) orally every 8 hours, As Needed (23 Jan 2023 07:01)  oxycodone-acetaminophen 5 mg-325 mg oral tablet: 1 tab(s) orally every 8 hours, As Needed (23 Jan 2023 07:01)  Ozempic 2 mg/1.5 mL (0.25 mg or 0.5 mg dose) subcutaneous solution: subcutaneous once a week (23 Jan 2023 07:01)  venlafaxine 150 mg oral tablet, extended release: 1 tab(s) orally once a day (23 Jan 2023 07:01)  Vraylar 3 mg oral capsule: 1 cap(s) orally once a day (23 Jan 2023 07:01)      REVIEW OF SYSTEMS:    Chronic low back pain with b/l radiculopathy ,   all other systems are reviewed and are negative .     MEDICATIONS  (STANDING):  acetaminophen     Tablet .. 975 milliGRAM(s) Oral every 8 hours  atorvastatin 40 milliGRAM(s) Oral at bedtime  ceFAZolin  Injectable. 2000 milliGRAM(s) IV Push <User Schedule>  dextrose 5%. 1000 milliLiter(s) (100 mL/Hr) IV Continuous <Continuous>  dextrose 5%. 1000 milliLiter(s) (50 mL/Hr) IV Continuous <Continuous>  dextrose 50% Injectable 12.5 Gram(s) IV Push once  dextrose 50% Injectable 25 Gram(s) IV Push once  glucagon  Injectable 1 milliGRAM(s) IntraMuscular once  insulin lispro (ADMELOG) corrective regimen sliding scale   SubCutaneous three times a day before meals  lactated ringers. 1000 milliLiter(s) (125 mL/Hr) IV Continuous <Continuous>  senna 2 Tablet(s) Oral at bedtime  venlafaxine XR. 150 milliGRAM(s) Oral daily    MEDICATIONS  (PRN):  bisacodyl 5 milliGRAM(s) Oral every 12 hours PRN Constipation  dextrose Oral Gel 15 Gram(s) Oral once PRN Blood Glucose LESS THAN 70 milliGRAM(s)/deciliter  HYDROmorphone   Tablet 4 milliGRAM(s) Oral every 3 hours PRN Severe Pain (7 - 10)  magnesium hydroxide Suspension 30 milliLiter(s) Oral every 12 hours PRN Constipation  methocarbamol 750 milliGRAM(s) Oral every 8 hours PRN Muscle Spasm  ondansetron Injectable 4 milliGRAM(s) IV Push every 6 hours PRN Nausea and/or Vomiting  oxyCODONE    IR 5 milliGRAM(s) Oral every 3 hours PRN Mild Pain (1 - 3)  oxyCODONE    IR 10 milliGRAM(s) Oral every 3 hours PRN Moderate Pain (4 - 6)      Vital Signs Last 24 Hrs  T(C): 37 (23 Jan 2023 14:15), Max: 37 (23 Jan 2023 14:15)  T(F): 98.6 (23 Jan 2023 14:15), Max: 98.6 (23 Jan 2023 14:15)  HR: 98 (23 Jan 2023 14:15) (90 - 105)  BP: 96/61 (23 Jan 2023 14:15) (92/45 - 117/68)  BP(mean): 82 (23 Jan 2023 14:00) (57 - 91)  RR: 18 (23 Jan 2023 14:15) (14 - 18)  SpO2: 100% (23 Jan 2023 14:15) (97% - 100%)    Parameters below as of 23 Jan 2023 14:15  Patient On (Oxygen Delivery Method): room air        PHYSICAL EXAM:    GENERAL: NAD, well-groomed, well-developed  HEAD:  Atraumatic, Normocephalic  EYES: EOMI, PERRLA, conjunctiva and sclera clear  NECK: Supple, No JVD, Normal thyroid  NERVOUS SYSTEM:  Alert & Oriented X4, no focal deficit   CHEST/LUNG: CTA  b/l,  no rales, rhonchi, wheezing, or rubs  HEART: Regular rate and rhythm; No murmurs, rubs, or gallops  ABDOMEN: Soft, Nontender, Nondistended; Bowel sounds present  EXTREMITIES:  2+ Peripheral Pulses, No clubbing, cyanosis, or edema ,   LYMPH: No lymphadenopathy noted  SKIN: No rashes or lesions  Low back dressing + , HV+ and draining     LABS:  A1C with Estimated Average Glucose (01.04.23 @ 13:10)    A1C with Estimated Average Glucose Result: 5.8 %    Estimated Average Glucose: 120 mg/dL        RADIOLOGY & ADDITIONAL STUDIES:

## 2023-01-24 LAB
ANION GAP SERPL CALC-SCNC: 9 MMOL/L — SIGNIFICANT CHANGE UP (ref 5–17)
BASOPHILS # BLD AUTO: 0.01 K/UL — SIGNIFICANT CHANGE UP (ref 0–0.2)
BASOPHILS NFR BLD AUTO: 0.1 % — SIGNIFICANT CHANGE UP (ref 0–2)
BUN SERPL-MCNC: 18.4 MG/DL — SIGNIFICANT CHANGE UP (ref 8–20)
CALCIUM SERPL-MCNC: 8.6 MG/DL — SIGNIFICANT CHANGE UP (ref 8.4–10.5)
CHLORIDE SERPL-SCNC: 102 MMOL/L — SIGNIFICANT CHANGE UP (ref 96–108)
CO2 SERPL-SCNC: 27 MMOL/L — SIGNIFICANT CHANGE UP (ref 22–29)
CREAT SERPL-MCNC: 1.12 MG/DL — SIGNIFICANT CHANGE UP (ref 0.5–1.3)
EGFR: 76 ML/MIN/1.73M2 — SIGNIFICANT CHANGE UP
EOSINOPHIL # BLD AUTO: 0 K/UL — SIGNIFICANT CHANGE UP (ref 0–0.5)
EOSINOPHIL NFR BLD AUTO: 0 % — SIGNIFICANT CHANGE UP (ref 0–6)
GLUCOSE BLDC GLUCOMTR-MCNC: 100 MG/DL — HIGH (ref 70–99)
GLUCOSE BLDC GLUCOMTR-MCNC: 101 MG/DL — HIGH (ref 70–99)
GLUCOSE BLDC GLUCOMTR-MCNC: 118 MG/DL — HIGH (ref 70–99)
GLUCOSE SERPL-MCNC: 113 MG/DL — HIGH (ref 70–99)
HCT VFR BLD CALC: 31 % — LOW (ref 39–50)
HGB BLD-MCNC: 10.5 G/DL — LOW (ref 13–17)
IMM GRANULOCYTES NFR BLD AUTO: 0.6 % — SIGNIFICANT CHANGE UP (ref 0–0.9)
LYMPHOCYTES # BLD AUTO: 0.92 K/UL — LOW (ref 1–3.3)
LYMPHOCYTES # BLD AUTO: 8.5 % — LOW (ref 13–44)
MCHC RBC-ENTMCNC: 28.7 PG — SIGNIFICANT CHANGE UP (ref 27–34)
MCHC RBC-ENTMCNC: 33.9 GM/DL — SIGNIFICANT CHANGE UP (ref 32–36)
MCV RBC AUTO: 84.7 FL — SIGNIFICANT CHANGE UP (ref 80–100)
MONOCYTES # BLD AUTO: 1.08 K/UL — HIGH (ref 0–0.9)
MONOCYTES NFR BLD AUTO: 9.9 % — SIGNIFICANT CHANGE UP (ref 2–14)
NEUTROPHILS # BLD AUTO: 8.78 K/UL — HIGH (ref 1.8–7.4)
NEUTROPHILS NFR BLD AUTO: 80.9 % — HIGH (ref 43–77)
PLATELET # BLD AUTO: 179 K/UL — SIGNIFICANT CHANGE UP (ref 150–400)
POTASSIUM SERPL-MCNC: 3.8 MMOL/L — SIGNIFICANT CHANGE UP (ref 3.5–5.3)
POTASSIUM SERPL-SCNC: 3.8 MMOL/L — SIGNIFICANT CHANGE UP (ref 3.5–5.3)
RBC # BLD: 3.66 M/UL — LOW (ref 4.2–5.8)
RBC # FLD: 12.9 % — SIGNIFICANT CHANGE UP (ref 10.3–14.5)
SODIUM SERPL-SCNC: 138 MMOL/L — SIGNIFICANT CHANGE UP (ref 135–145)
WBC # BLD: 10.86 K/UL — HIGH (ref 3.8–10.5)
WBC # FLD AUTO: 10.86 K/UL — HIGH (ref 3.8–10.5)

## 2023-01-24 PROCEDURE — 99232 SBSQ HOSP IP/OBS MODERATE 35: CPT

## 2023-01-24 RX ORDER — CARIPRAZINE 1.5 MG/1
3 CAPSULE, GELATIN COATED ORAL DAILY
Refills: 0 | Status: DISCONTINUED | OUTPATIENT
Start: 2023-01-24 | End: 2023-01-26

## 2023-01-24 RX ADMIN — METHOCARBAMOL 750 MILLIGRAM(S): 500 TABLET, FILM COATED ORAL at 04:46

## 2023-01-24 RX ADMIN — Medication 150 MILLIGRAM(S): at 12:12

## 2023-01-24 RX ADMIN — CARIPRAZINE 3 MILLIGRAM(S): 1.5 CAPSULE, GELATIN COATED ORAL at 11:30

## 2023-01-24 RX ADMIN — Medication 975 MILLIGRAM(S): at 22:14

## 2023-01-24 RX ADMIN — HYDROMORPHONE HYDROCHLORIDE 4 MILLIGRAM(S): 2 INJECTION INTRAMUSCULAR; INTRAVENOUS; SUBCUTANEOUS at 17:50

## 2023-01-24 RX ADMIN — Medication 975 MILLIGRAM(S): at 05:56

## 2023-01-24 RX ADMIN — Medication 975 MILLIGRAM(S): at 21:14

## 2023-01-24 RX ADMIN — SODIUM CHLORIDE 125 MILLILITER(S): 9 INJECTION, SOLUTION INTRAVENOUS at 04:46

## 2023-01-24 RX ADMIN — HYDROMORPHONE HYDROCHLORIDE 4 MILLIGRAM(S): 2 INJECTION INTRAMUSCULAR; INTRAVENOUS; SUBCUTANEOUS at 05:56

## 2023-01-24 RX ADMIN — HYDROMORPHONE HYDROCHLORIDE 4 MILLIGRAM(S): 2 INJECTION INTRAMUSCULAR; INTRAVENOUS; SUBCUTANEOUS at 03:35

## 2023-01-24 RX ADMIN — HYDROMORPHONE HYDROCHLORIDE 4 MILLIGRAM(S): 2 INJECTION INTRAMUSCULAR; INTRAVENOUS; SUBCUTANEOUS at 16:52

## 2023-01-24 RX ADMIN — Medication 325 MILLIGRAM(S): at 11:31

## 2023-01-24 RX ADMIN — HYDROMORPHONE HYDROCHLORIDE 4 MILLIGRAM(S): 2 INJECTION INTRAMUSCULAR; INTRAVENOUS; SUBCUTANEOUS at 06:56

## 2023-01-24 RX ADMIN — Medication 975 MILLIGRAM(S): at 06:57

## 2023-01-24 RX ADMIN — HYDROMORPHONE HYDROCHLORIDE 4 MILLIGRAM(S): 2 INJECTION INTRAMUSCULAR; INTRAVENOUS; SUBCUTANEOUS at 21:15

## 2023-01-24 RX ADMIN — Medication 975 MILLIGRAM(S): at 14:12

## 2023-01-24 RX ADMIN — HYDROMORPHONE HYDROCHLORIDE 4 MILLIGRAM(S): 2 INJECTION INTRAMUSCULAR; INTRAVENOUS; SUBCUTANEOUS at 02:35

## 2023-01-24 RX ADMIN — ATORVASTATIN CALCIUM 40 MILLIGRAM(S): 80 TABLET, FILM COATED ORAL at 21:14

## 2023-01-24 RX ADMIN — Medication 2000 MILLIGRAM(S): at 00:11

## 2023-01-24 RX ADMIN — SENNA PLUS 2 TABLET(S): 8.6 TABLET ORAL at 21:14

## 2023-01-24 RX ADMIN — Medication 975 MILLIGRAM(S): at 15:10

## 2023-01-24 RX ADMIN — HYDROMORPHONE HYDROCHLORIDE 4 MILLIGRAM(S): 2 INJECTION INTRAMUSCULAR; INTRAVENOUS; SUBCUTANEOUS at 22:15

## 2023-01-24 NOTE — PROGRESS NOTE ADULT - SUBJECTIVE AND OBJECTIVE BOX
Patient seen and examined . S/p  , POD # 1. Doing well , c/o mild pain at surgical site , denies n/v , Zayra removed this am ,TOV in progress.     CC : Low back pain well controlled post op with pain meds       MEDICATIONS  (STANDING):  acetaminophen     Tablet .. 975 milliGRAM(s) Oral every 8 hours  aspirin enteric coated 325 milliGRAM(s) Oral daily  atorvastatin 40 milliGRAM(s) Oral at bedtime  cariprazine 3 milliGRAM(s) Oral daily  insulin lispro (ADMELOG) corrective regimen sliding scale   SubCutaneous three times a day before meals  lactated ringers. 1000 milliLiter(s) (125 mL/Hr) IV Continuous <Continuous>  senna 2 Tablet(s) Oral at bedtime  venlafaxine XR. 150 milliGRAM(s) Oral daily    MEDICATIONS  (PRN):  bisacodyl 5 milliGRAM(s) Oral every 12 hours PRN Constipation  HYDROmorphone   Tablet 4 milliGRAM(s) Oral every 3 hours PRN Severe Pain (7 - 10)  magnesium hydroxide Suspension 30 milliLiter(s) Oral every 12 hours PRN Constipation  methocarbamol 750 milliGRAM(s) Oral every 8 hours PRN Muscle Spasm  ondansetron Injectable 4 milliGRAM(s) IV Push every 6 hours PRN Nausea and/or Vomiting  oxyCODONE    IR 5 milliGRAM(s) Oral every 3 hours PRN Mild Pain (1 - 3)  oxyCODONE    IR 10 milliGRAM(s) Oral every 3 hours PRN Moderate Pain (4 - 6)      LABS:                          10.5   10.86 )-----------( 179      ( 24 Jan 2023 05:55 )             31.0     01-24    138  |  102  |  18.4  ----------------------------<  113<H>  3.8   |  27.0  |  1.12    Ca    8.6      24 Jan 2023 05:55    I&O's Detail    23 Jan 2023 07:01  -  24 Jan 2023 07:00  --------------------------------------------------------  IN:    Lactated Ringers: 1000 mL    Lactated Ringers: 150 mL    Lactated Ringers Bolus: 1000 mL    Oral Fluid: 240 mL  Total IN: 2390 mL    OUT:    Accordian (mL): 455 mL    Indwelling Catheter - Urethral (mL): 2990 mL  Total OUT: 3445 mL    Total NET: -1055 mL      24 Jan 2023 07:01  -  24 Jan 2023 12:13  --------------------------------------------------------  IN:  Total IN: 0 mL    OUT:    Accordian (mL): 145 mL    Voided (mL): 350 mL  Total OUT: 495 mL    Total NET: -495 mL    RADIOLOGY & ADDITIONAL TESTS:          REVIEW OF SYSTEMS:    Mild low back pain at surgical site , all other systems are reviewed and are negative .     Vital Signs Last 24 Hrs  T(C): 37 (24 Jan 2023 09:11), Max: 37 (23 Jan 2023 14:15)  T(F): 98.6 (24 Jan 2023 09:11), Max: 98.6 (23 Jan 2023 14:15)  HR: 89 (24 Jan 2023 09:11) (72 - 105)  BP: 115/68 (24 Jan 2023 09:11) (92/45 - 119/73)  BP(mean): 82 (23 Jan 2023 14:00) (57 - 91)  RR: 18 (24 Jan 2023 09:11) (14 - 18)  SpO2: 94% (24 Jan 2023 09:11) (92% - 100%)    Parameters below as of 24 Jan 2023 09:11  Patient On (Oxygen Delivery Method): room air      PHYSICAL EXAM:    GENERAL: NAD, well-groomed, well-developed  HEAD:  Atraumatic, Normocephalic  EYES: EOMI, PERRLA, conjunctiva and sclera clear  NECK: Supple, No JVD, Normal thyroid  NERVOUS SYSTEM:  Alert & Oriented X3, no focal deficit  CHEST/LUNG: CTA b/l ,  no  rales, rhonchi, wheezing, or rubs  HEART: Regular rate and rhythm; No murmurs, rubs, or gallops  ABDOMEN: Soft, Nontender, Nondistended; Bowel sounds present  EXTREMITIES:  2+ Peripheral Pulses, No clubbing, cyanosis, or edema  LYMPH: No lymphadenopathy noted  SKIN: No rashes or lesions  Low back dressing + , HV +   Keen cath out

## 2023-01-24 NOTE — PROGRESS NOTE ADULT - SUBJECTIVE AND OBJECTIVE BOX
Interval Hx:  Patient seen during rounds  Patient reports pain to be controlled on current medications  Patient denies sedation with medications     Analgesic Dosing for past 24 hours reviewed as below:    acetaminophen     Tablet ..   975 milliGRAM(s) Oral (01-24-23 @ 05:56)   975 milliGRAM(s) Oral (01-23-23 @ 22:16)    cariprazine   3 milliGRAM(s) Oral (01-24-23 @ 11:30)    HYDROmorphone   Tablet   4 milliGRAM(s) Oral (01-24-23 @ 05:56)   4 milliGRAM(s) Oral (01-24-23 @ 02:35)    methocarbamol   750 milliGRAM(s) Oral (01-24-23 @ 04:46)   750 milliGRAM(s) Oral (01-23-23 @ 13:28)    oxyCODONE    IR   10 milliGRAM(s) Oral (01-23-23 @ 22:20)   10 milliGRAM(s) Oral (01-23-23 @ 17:21)    venlafaxine XR.   150 milliGRAM(s) Oral (01-24-23 @ 12:12)   150 milliGRAM(s) Oral (01-23-23 @ 18:08)          T(C): 37 (01-24-23 @ 09:11), Max: 37 (01-23-23 @ 14:15)  HR: 89 (01-24-23 @ 09:11) (72 - 105)  BP: 115/68 (01-24-23 @ 09:11) (92/45 - 119/73)  RR: 18 (01-24-23 @ 09:11) (14 - 18)  SpO2: 94% (01-24-23 @ 09:11) (92% - 100%)      01-23-23 @ 07:01  -  01-24-23 @ 07:00  --------------------------------------------------------  IN: 2390 mL / OUT: 3445 mL / NET: -1055 mL    01-24-23 @ 07:01  -  01-24-23 @ 12:53  --------------------------------------------------------  IN: 0 mL / OUT: 495 mL / NET: -495 mL        acetaminophen     Tablet .. 975 milliGRAM(s) Oral every 8 hours  aspirin enteric coated 325 milliGRAM(s) Oral daily  atorvastatin 40 milliGRAM(s) Oral at bedtime  bisacodyl 5 milliGRAM(s) Oral every 12 hours PRN  cariprazine 3 milliGRAM(s) Oral daily  HYDROmorphone   Tablet 4 milliGRAM(s) Oral every 3 hours PRN  insulin lispro (ADMELOG) corrective regimen sliding scale   SubCutaneous three times a day before meals  lactated ringers. 1000 milliLiter(s) IV Continuous <Continuous>  magnesium hydroxide Suspension 30 milliLiter(s) Oral every 12 hours PRN  methocarbamol 750 milliGRAM(s) Oral every 8 hours PRN  ondansetron Injectable 4 milliGRAM(s) IV Push every 6 hours PRN  oxyCODONE    IR 5 milliGRAM(s) Oral every 3 hours PRN  oxyCODONE    IR 10 milliGRAM(s) Oral every 3 hours PRN  senna 2 Tablet(s) Oral at bedtime  venlafaxine XR. 150 milliGRAM(s) Oral daily                          10.5   10.86 )-----------( 179      ( 24 Jan 2023 05:55 )             31.0     01-24    138  |  102  |  18.4  ----------------------------<  113<H>  3.8   |  27.0  |  1.12    Ca    8.6      24 Jan 2023 05:55            Pain Service   [phone]

## 2023-01-24 NOTE — PROGRESS NOTE ADULT - SUBJECTIVE AND OBJECTIVE BOX
MARY TIWARIMANN  [id number]                      SUBJECTIVE: Patient is a 57yMale s/p L4-L5 lami, L4-S1 fusion, POD#1 seen and examined at the bedside. Patient reports pain is controlled with prescribed medication. Patient is participating with PT. Patient denies acute motor or sensory changes. Reports no pre-op paresthesias currently. Denies CP, SOB, dizziness, HA.     OBJECTIVE:   Vital Signs Last 24 Hrs  T(C): 37 (24 Jan 2023 05:03), Max: 37 (23 Jan 2023 14:15)  T(F): 98.6 (24 Jan 2023 05:03), Max: 98.6 (23 Jan 2023 14:15)  HR: 99 (24 Jan 2023 05:03) (72 - 105)  BP: 107/63 (24 Jan 2023 05:03) (92/45 - 119/73)  BP(mean): 82 (23 Jan 2023 14:00) (57 - 91)  RR: 18 (24 Jan 2023 05:03) (14 - 18)  SpO2: 93% (24 Jan 2023 05:03) (92% - 100%)    Parameters below as of 24 Jan 2023 05:03  Patient On (Oxygen Delivery Method): room air    \par                         10.5   10.86 )-----------( 179      ( 24 Jan 2023 05:55 )             31.0       PHYSICAL EXAM:  Constitutional: Alert, awake  Spine:          Dressing: clean/dry/intact           Drains:  drain intact (Output 300cc x 12hrs, 455cc x24hrs)           Sensation:          Lower extremity                         sp         dp         saph       byrd         tibial                                                R          +           +             +           +          +                                               L           +           +             +           +          +                     Motor exam:          Lower extremity                      HF(l2)   KE(l3)    TA(l4)   EHL(l5)  GS(s1)                                                 R        5/5        5/5        5/5       5/5         5/5                                               L         5/5        5/5       5/5       5/5          5/5                                                 B/L LE: warm well perfused; capillary refill <3 seconds. BCR. Calves soft NT          A/P :  57y Male S/P L4-L5 lami, L4-S1 fusion, POD#1    -    Pain control  -    DVT ppx: SCDs,  QD x 28d post-op     -    Abx as per SCIP  -    Monitor Drain Output  -    Resume home meds  -    Physical Therapy  -    Weight bearing status: WBAT

## 2023-01-25 LAB
GLUCOSE BLDC GLUCOMTR-MCNC: 102 MG/DL — HIGH (ref 70–99)
GLUCOSE BLDC GLUCOMTR-MCNC: 115 MG/DL — HIGH (ref 70–99)
GLUCOSE BLDC GLUCOMTR-MCNC: 79 MG/DL — SIGNIFICANT CHANGE UP (ref 70–99)
GLUCOSE BLDC GLUCOMTR-MCNC: 92 MG/DL — SIGNIFICANT CHANGE UP (ref 70–99)
HCT VFR BLD CALC: 30.4 % — LOW (ref 39–50)
HGB BLD-MCNC: 10.5 G/DL — LOW (ref 13–17)
MCHC RBC-ENTMCNC: 29.2 PG — SIGNIFICANT CHANGE UP (ref 27–34)
MCHC RBC-ENTMCNC: 34.5 GM/DL — SIGNIFICANT CHANGE UP (ref 32–36)
MCV RBC AUTO: 84.7 FL — SIGNIFICANT CHANGE UP (ref 80–100)
PLATELET # BLD AUTO: 167 K/UL — SIGNIFICANT CHANGE UP (ref 150–400)
RBC # BLD: 3.59 M/UL — LOW (ref 4.2–5.8)
RBC # FLD: 13 % — SIGNIFICANT CHANGE UP (ref 10.3–14.5)
WBC # BLD: 8.58 K/UL — SIGNIFICANT CHANGE UP (ref 3.8–10.5)
WBC # FLD AUTO: 8.58 K/UL — SIGNIFICANT CHANGE UP (ref 3.8–10.5)

## 2023-01-25 PROCEDURE — 99232 SBSQ HOSP IP/OBS MODERATE 35: CPT

## 2023-01-25 RX ADMIN — CARIPRAZINE 3 MILLIGRAM(S): 1.5 CAPSULE, GELATIN COATED ORAL at 12:11

## 2023-01-25 RX ADMIN — HYDROMORPHONE HYDROCHLORIDE 4 MILLIGRAM(S): 2 INJECTION INTRAMUSCULAR; INTRAVENOUS; SUBCUTANEOUS at 01:46

## 2023-01-25 RX ADMIN — Medication 975 MILLIGRAM(S): at 22:52

## 2023-01-25 RX ADMIN — ATORVASTATIN CALCIUM 40 MILLIGRAM(S): 80 TABLET, FILM COATED ORAL at 22:51

## 2023-01-25 RX ADMIN — OXYCODONE HYDROCHLORIDE 5 MILLIGRAM(S): 5 TABLET ORAL at 17:07

## 2023-01-25 RX ADMIN — Medication 975 MILLIGRAM(S): at 13:48

## 2023-01-25 RX ADMIN — HYDROMORPHONE HYDROCHLORIDE 4 MILLIGRAM(S): 2 INJECTION INTRAMUSCULAR; INTRAVENOUS; SUBCUTANEOUS at 05:13

## 2023-01-25 RX ADMIN — Medication 975 MILLIGRAM(S): at 05:13

## 2023-01-25 RX ADMIN — Medication 975 MILLIGRAM(S): at 14:41

## 2023-01-25 RX ADMIN — Medication 150 MILLIGRAM(S): at 12:02

## 2023-01-25 RX ADMIN — HYDROMORPHONE HYDROCHLORIDE 4 MILLIGRAM(S): 2 INJECTION INTRAMUSCULAR; INTRAVENOUS; SUBCUTANEOUS at 02:31

## 2023-01-25 RX ADMIN — Medication 325 MILLIGRAM(S): at 12:10

## 2023-01-25 RX ADMIN — Medication 10 MILLIGRAM(S): at 05:13

## 2023-01-25 RX ADMIN — OXYCODONE HYDROCHLORIDE 5 MILLIGRAM(S): 5 TABLET ORAL at 18:10

## 2023-01-25 RX ADMIN — HYDROMORPHONE HYDROCHLORIDE 4 MILLIGRAM(S): 2 INJECTION INTRAMUSCULAR; INTRAVENOUS; SUBCUTANEOUS at 22:52

## 2023-01-25 RX ADMIN — HYDROMORPHONE HYDROCHLORIDE 4 MILLIGRAM(S): 2 INJECTION INTRAMUSCULAR; INTRAVENOUS; SUBCUTANEOUS at 23:37

## 2023-01-25 RX ADMIN — SENNA PLUS 2 TABLET(S): 8.6 TABLET ORAL at 22:51

## 2023-01-25 RX ADMIN — Medication 975 MILLIGRAM(S): at 23:40

## 2023-01-25 NOTE — PROGRESS NOTE ADULT - ASSESSMENT
57M  chronic pain on perocet-5 prn  s/p Lumbar fusion using cage 23-Jan-2023     Pain controlled  Pain service will sign off  Please reconsult as needed 
56 y/o male with PMH HTN, HLD, anxiety, bipolar disorder, GERD and spinal stenosis presents to PST with complaints of low back pain x 20 years that is getting progressively worse. States he has been following with pain management, he has had injections and different pain medication with no relief. Pain is constant, described as throbbing, radiates down b/l legs, 8/10 in severity at its worst, worse with sitting, driving and in the morning, relieved with hot showers.    1. Chronic low back pain with radiculopathy due to lumbar spondylosis ,   s/p lumbar fusin using cage , POD#01,  PT/OT/pain mgmt  DVT prophylaxis- as per ortho  Abx as per SCIP- given   Incentive spirometry  Prophylaxis of opioid  induced constipation.  Wound care / HV as per ortho ,   monitor output    2. HTN - BP postop on lower side , this am BP better controlled ,   hold Valsartan / HCTZ for now , restart POD #2 with parameters , monitor - patient is asymptomatic    3. HLD - continue Atorvastatin 40 mg QHS    4. GERD - continue Protonix     5. Hx of Bipolar/ Anxiety - continue Venlafaxine 150 mg daily / Vraylar 3 mg daily -   if not formulary may use own     6. Patient takes Ozempic for weigh loss - lost 8 lb  in 4 wks - may continue on discharge   Prediabetes : preop A1C  5.8 , patient is informed , educated about diet , life style changes   and need to follow up with PMD in 3 months to check A1C and follow further recommendations     7. Chronic pain due to low back pain , on chronic opioid use,   pain mgmt appreciated - continue meds as per recommendations     8. DVT prophylaxis  - as per ortho protocol  Opioid induced constipation  prophylaxis - bowel regimen     Periop Keen  removed  , TOV in  progress     Leucocytosis - no S/S of infection - likely reactive    Anemia - acute blood lost anemia - secondary to surgical blood lost , EBL - 250 ml -   patient is asymptomatic - monitor h/h in am .     Dispo plan is Home - once HV removed .     If HV removed in am and patient HD stable - may discharge .      
58 y/o male with PMH HTN, HLD, anxiety, bipolar disorder, GERD and spinal stenosis presents to PST with complaints of low back pain x 20 years that is getting progressively worse. States he has been following with pain management, he has had injections and different pain medication with no relief. Pain is constant, described as throbbing, radiates down b/l legs, 8/10 in severity at its worst, worse with sitting, driving and in the morning, relieved with hot showers.    1. Chronic low back pain with radiculopathy due to lumbar spondylosis ,   s/p lumbar fusin using cage , POD#02   PT/OT/pain mgmt  DVT prophylaxis- as per ortho  Abx as per SCIP- given   Incentive spirometry  Prophylaxis of opioid  induced constipation.  Wound care / HV as per ortho ,   monitor output    2. HTN - BP postop on lower side , this am BP better controlled ,   hold Valsartan / HCTZ for now , restart POD #2 with parameters , monitor - patient is asymptomatic    3. HLD - continue Atorvastatin 40 mg QHS    4. GERD - continue Protonix     5. Hx of Bipolar/ Anxiety - continue Venlafaxine 150 mg daily / Vraylar 3 mg daily -   if not formulary may use own     6. Obesity - BMI 31 . Patient takes Ozempic for weigh loss - lost 8 lb  in 4 wks - may continue on discharge   Prediabetes : preop A1C  5.8 , patient is informed , educated about diet , life style changes   and need to follow up with PMD in 3 months to check A1C and follow further recommendations     7. Chronic pain due to low back pain , on chronic opioid use,   pain mgmt appreciated - continue meds as per recommendations     8. DVT prophylaxis  - as per ortho protocol- ASA   Opioid induced constipation  prophylaxis - bowel regimen     Periop Keen  removed - voiding with out difficulty     Leucocytosis - no S/S of infection - likely reactive- resolved     Anemia - acute blood lost anemia - secondary to surgical blood lost , EBL - 250 ml - not significant ,   patient is asymptomatic , H/H stable     Dispo plan is Home - once HV removed .      Medically stable to d/c once cleared by physical therapy / ortho once HV removed .   Will sign off , please recall if any medical issue .

## 2023-01-25 NOTE — PROGRESS NOTE ADULT - SUBJECTIVE AND OBJECTIVE BOX
Pt Name: MARY MORA    MRN: [id number]      Patient is a 57 year old male s/p L4-5 laminectomy, POD#2.  Comfortable in bed, no new orthopedic complaints.       PAST MEDICAL & SURGICAL HISTORY:  PAST MEDICAL & SURGICAL HISTORY:  HTN (hypertension)      HLD (hyperlipidemia)      GERD (gastroesophageal reflux disease)      Bipolar disorder      Anxiety      Spinal stenosis      H/O carpal tunnel repair      Amputation of finger tip          Allergies: No Known Allergies      Medications: acetaminophen     Tablet .. 975 milliGRAM(s) Oral every 8 hours  aspirin enteric coated 325 milliGRAM(s) Oral daily  atorvastatin 40 milliGRAM(s) Oral at bedtime  bisacodyl 5 milliGRAM(s) Oral every 12 hours PRN  cariprazine 3 milliGRAM(s) Oral daily  enalapril 10 milliGRAM(s) Oral daily  hydrochlorothiazide 25 milliGRAM(s) Oral daily  HYDROmorphone   Tablet 4 milliGRAM(s) Oral every 3 hours PRN  insulin lispro (ADMELOG) corrective regimen sliding scale   SubCutaneous three times a day before meals  lactated ringers. 1000 milliLiter(s) IV Continuous <Continuous>  magnesium hydroxide Suspension 30 milliLiter(s) Oral every 12 hours PRN  methocarbamol 750 milliGRAM(s) Oral every 8 hours PRN  ondansetron Injectable 4 milliGRAM(s) IV Push every 6 hours PRN  oxyCODONE    IR 5 milliGRAM(s) Oral every 3 hours PRN  oxyCODONE    IR 10 milliGRAM(s) Oral every 3 hours PRN  senna 2 Tablet(s) Oral at bedtime  venlafaxine XR. 150 milliGRAM(s) Oral daily        Ambulation: Walking With Walker                        10.5   8.58  )-----------( 167      ( 2023 05:29 )             30.4     01-    138  |  102  |  18.4  ----------------------------<  113<H>  3.8   |  27.0  |  1.12    Ca    8.6      2023 05:55        PHYSICAL EXAM:    Vital Signs Last 24 Hrs  T(C): 36.9 (2023 05:00), Max: 37.1 (2023 21:51)  T(F): 98.4 (2023 05:00), Max: 98.8 (2023 21:51)  HR: 93 (2023 05:00) (87 - 93)  BP: 133/89 (2023 05:00) (115/68 - 170/88)  BP(mean): --  RR: 18 (2023 05:00) (18 - 18)  SpO2: 95% (2023 05:00) (94% - 98%)    Parameters below as of 2023 05:00  Patient On (Oxygen Delivery Method): room air      Daily     Daily Weight in k (2023 21:51)    Appearance: Alert, responsive, in no acute distress.    Neurological: Sensation is grossly intact to light touch. 5/5 motor function of all extremities. No focal deficits or weaknesses found.    Skin: no rash on visible skin. Skin is clean, dry and intact. No bleeding. No abrasions. No ulcerations. Small area of dried bloody drainainge noted on dressings at drain site.  Drain remains in place. 160 cc output overnight.  Will continue to monitor the drain.     Vascular: 2+ distal pulses. Cap refill < 2 sec. No signs of venous   insufficiency   or stasis. No extremity ulcerations. No cyanosis.    Musculoskeletal:         Bilateral lower extremities 5/5 strength.  EHL/FHL intact.   Cmpartments soft non tender. Palpable distal pulses  A/P:  Pt is a  57y Male s/p L4-5 lumbar laminectomy/ fusion    PLAN:   * WBAT/ PT gait training  Aspirin 325 daily for DVT prophalaxis  Continue to monitor drain output.   Discharge planning to home.

## 2023-01-25 NOTE — PROGRESS NOTE ADULT - SUBJECTIVE AND OBJECTIVE BOX
Patient seen and examined . S/p s/p L4-5 laminectomy , POD # 2. Doing well , pain well controlled , denies n/v ,   voiding without difficulty , participating with [physical therapy .     CC : Low back chronic pain post op well controlled       MEDICATIONS  (STANDING):  acetaminophen     Tablet .. 975 milliGRAM(s) Oral every 8 hours  aspirin enteric coated 325 milliGRAM(s) Oral daily  atorvastatin 40 milliGRAM(s) Oral at bedtime  cariprazine 3 milliGRAM(s) Oral daily  enalapril 10 milliGRAM(s) Oral daily  hydrochlorothiazide 25 milliGRAM(s) Oral daily  insulin lispro (ADMELOG) corrective regimen sliding scale   SubCutaneous three times a day before meals  lactated ringers. 1000 milliLiter(s) (125 mL/Hr) IV Continuous <Continuous>  senna 2 Tablet(s) Oral at bedtime  venlafaxine XR. 150 milliGRAM(s) Oral daily    MEDICATIONS  (PRN):  bisacodyl 5 milliGRAM(s) Oral every 12 hours PRN Constipation  HYDROmorphone   Tablet 4 milliGRAM(s) Oral every 3 hours PRN Severe Pain (7 - 10)  magnesium hydroxide Suspension 30 milliLiter(s) Oral every 12 hours PRN Constipation  methocarbamol 750 milliGRAM(s) Oral every 8 hours PRN Muscle Spasm  ondansetron Injectable 4 milliGRAM(s) IV Push every 6 hours PRN Nausea and/or Vomiting  oxyCODONE    IR 5 milliGRAM(s) Oral every 3 hours PRN Mild Pain (1 - 3)  oxyCODONE    IR 10 milliGRAM(s) Oral every 3 hours PRN Moderate Pain (4 - 6)      LABS:                          10.5   8.58  )-----------( 167      ( 25 Jan 2023 05:29 )             30.4     01-24    138  |  102  |  18.4  ----------------------------<  113<H>  3.8   |  27.0  |  1.12    Ca    8.6      24 Jan 2023 05:55    I&O's Detail    24 Jan 2023 07:01  -  25 Jan 2023 07:00  --------------------------------------------------------  IN:    Oral Fluid: 230 mL  Total IN: 230 mL    OUT:    Accordian (mL): 530 mL    Voided (mL): 1050 mL  Total OUT: 1580 mL    Total NET: -1350 mL                REVIEW OF SYSTEMS:      Vital Signs Last 24 Hrs  T(C): 37.3 (25 Jan 2023 09:15), Max: 37.3 (25 Jan 2023 09:15)  T(F): 99.1 (25 Jan 2023 09:15), Max: 99.1 (25 Jan 2023 09:15)  HR: 99 (25 Jan 2023 09:15) (87 - 99)  BP: 119/85 (25 Jan 2023 09:15) (119/85 - 170/88)  BP(mean): --  RR: 18 (25 Jan 2023 09:15) (18 - 18)  SpO2: 97% (25 Jan 2023 09:15) (95% - 98%)    Parameters below as of 25 Jan 2023 09:15  Patient On (Oxygen Delivery Method): room air      PHYSICAL EXAM:    GENERAL: NAD, well-groomed, well-developed  HEAD:  Atraumatic, Normocephalic  EYES: EOMI, PERRLA, conjunctiva and sclera clear  NECK: Supple, No JVD, Normal thyroid  NERVOUS SYSTEM:  Alert & Oriented X3, no focal deficit, motor 5/5 in all 4 extr, sensation intact   CHEST/LUNG: CTA b/l ,  no  rales, rhonchi, wheezing, or rubs  HEART: Regular rate and rhythm; No murmurs, rubs, or gallops  ABDOMEN: Soft, Nontender, Nondistended; Bowel sounds present  EXTREMITIES:  2+ Peripheral Pulses, No clubbing, cyanosis, or edema  LYMPH: No lymphadenopathy noted  SKIN: No rashes or lesions  Low back dressing + , HV+   Small area of dried bloody drainage noted on dressings at drain site.

## 2023-01-26 ENCOUNTER — TRANSCRIPTION ENCOUNTER (OUTPATIENT)
Age: 58
End: 2023-01-26

## 2023-01-26 VITALS
RESPIRATION RATE: 17 BRPM | TEMPERATURE: 99 F | OXYGEN SATURATION: 98 % | SYSTOLIC BLOOD PRESSURE: 133 MMHG | HEART RATE: 91 BPM | DIASTOLIC BLOOD PRESSURE: 80 MMHG

## 2023-01-26 LAB
GLUCOSE BLDC GLUCOMTR-MCNC: 105 MG/DL — HIGH (ref 70–99)
GLUCOSE BLDC GLUCOMTR-MCNC: 115 MG/DL — HIGH (ref 70–99)

## 2023-01-26 PROCEDURE — 76000 FLUOROSCOPY <1 HR PHYS/QHP: CPT

## 2023-01-26 PROCEDURE — 82962 GLUCOSE BLOOD TEST: CPT

## 2023-01-26 PROCEDURE — 80048 BASIC METABOLIC PNL TOTAL CA: CPT

## 2023-01-26 PROCEDURE — 85027 COMPLETE CBC AUTOMATED: CPT

## 2023-01-26 PROCEDURE — C1889: CPT

## 2023-01-26 PROCEDURE — C9399: CPT

## 2023-01-26 PROCEDURE — 36415 COLL VENOUS BLD VENIPUNCTURE: CPT

## 2023-01-26 PROCEDURE — 85025 COMPLETE CBC W/AUTO DIFF WBC: CPT

## 2023-01-26 PROCEDURE — C1713: CPT

## 2023-01-26 RX ORDER — DICLOFENAC SODIUM 75 MG/1
1 TABLET, DELAYED RELEASE ORAL
Qty: 0 | Refills: 0 | DISCHARGE

## 2023-01-26 RX ORDER — OXYCODONE HYDROCHLORIDE 5 MG/1
1 TABLET ORAL
Qty: 28 | Refills: 0
Start: 2023-01-26 | End: 2023-02-01

## 2023-01-26 RX ORDER — OXYCODONE AND ACETAMINOPHEN 5; 325 MG/1; MG/1
1 TABLET ORAL
Qty: 0 | Refills: 0 | DISCHARGE

## 2023-01-26 RX ORDER — ASPIRIN/CALCIUM CARB/MAGNESIUM 324 MG
1 TABLET ORAL
Qty: 28 | Refills: 0
Start: 2023-01-26 | End: 2023-02-22

## 2023-01-26 RX ORDER — SENNOSIDES/DOCUSATE SODIUM 8.6MG-50MG
2 TABLET ORAL
Qty: 20 | Refills: 0
Start: 2023-01-26 | End: 2023-02-04

## 2023-01-26 RX ORDER — METHOCARBAMOL 500 MG/1
1 TABLET, FILM COATED ORAL
Qty: 21 | Refills: 0
Start: 2023-01-26 | End: 2023-02-01

## 2023-01-26 RX ORDER — ACETAMINOPHEN 500 MG
2 TABLET ORAL
Qty: 80 | Refills: 0
Start: 2023-01-26 | End: 2023-02-04

## 2023-01-26 RX ADMIN — Medication 10 MILLIGRAM(S): at 06:07

## 2023-01-26 RX ADMIN — CARIPRAZINE 3 MILLIGRAM(S): 1.5 CAPSULE, GELATIN COATED ORAL at 11:47

## 2023-01-26 RX ADMIN — Medication 975 MILLIGRAM(S): at 06:30

## 2023-01-26 RX ADMIN — Medication 150 MILLIGRAM(S): at 11:46

## 2023-01-26 RX ADMIN — Medication 975 MILLIGRAM(S): at 06:07

## 2023-01-26 RX ADMIN — Medication 325 MILLIGRAM(S): at 11:47

## 2023-01-26 NOTE — PROGRESS NOTE ADULT - SUBJECTIVE AND OBJECTIVE BOX
ORTHO-SPINE POST-OP PROGRESS NOTE:      [id number]    MARY MORA      PROCEDURE: L4-5 laminectomy and L4-S1 fusion - POD #3.    DOS: 1/23/23.       SUBJECTIVE: Patient seen and examined. Patient reports mild low back discomfort that is controlled by pain medications. Patient denies acute sensory or motor changes. Patient is tolerating diet and has voided post-op. He is participating in bedside PT. No acute orthopedic complaints are expressed at this time. Patient denies fever, chills, chest pain, SOB, abdominal pain, calf pain, numbness, or weakness.                             10.5   8.58  )-----------( 167      ( 25 Jan 2023 05:29 )             30.4               I&O's Detail    25 Jan 2023 07:01  -  26 Jan 2023 07:00  --------------------------------------------------------  IN:  Total IN: 0 mL    OUT:    Accordian (mL): 220 mL  Total OUT: 220 mL    Total NET: -220 mL            acetaminophen     Tablet .. 975 milliGRAM(s) Oral every 8 hours  aspirin enteric coated 325 milliGRAM(s) Oral daily  atorvastatin 40 milliGRAM(s) Oral at bedtime  bisacodyl 5 milliGRAM(s) Oral every 12 hours PRN  cariprazine 3 milliGRAM(s) Oral daily  enalapril 10 milliGRAM(s) Oral daily  hydrochlorothiazide 25 milliGRAM(s) Oral daily  HYDROmorphone   Tablet 4 milliGRAM(s) Oral every 3 hours PRN  insulin lispro (ADMELOG) corrective regimen sliding scale   SubCutaneous three times a day before meals  lactated ringers. 1000 milliLiter(s) IV Continuous <Continuous>  magnesium hydroxide Suspension 30 milliLiter(s) Oral every 12 hours PRN  methocarbamol 750 milliGRAM(s) Oral every 8 hours PRN  ondansetron Injectable 4 milliGRAM(s) IV Push every 6 hours PRN  oxyCODONE    IR 5 milliGRAM(s) Oral every 3 hours PRN  oxyCODONE    IR 10 milliGRAM(s) Oral every 3 hours PRN  senna 2 Tablet(s) Oral at bedtime  venlafaxine XR. 150 milliGRAM(s) Oral daily        T(C): 37 (01-26-23 @ 05:00), Max: 37.4 (01-25-23 @ 20:47)  HR: 94 (01-26-23 @ 05:00) (87 - 96)  BP: 120/86 (01-26-23 @ 05:00) (120/86 - 151/81)  RR: 18 (01-26-23 @ 05:00) (18 - 18)  SpO2: 97% (01-26-23 @ 05:00) (93% - 97%)  Wt(kg): --      PHYSICAL EXAM:     Constitutional: Alert, responsive, in no acute distress.     Abdominal: soft and supple, non distended      Spine:          Dressing: Clean/dry/intact WITH DRAIN NOTED. No active bleeding or discharge noted.            Drains: present with OUTPUT of 150 mL serosanguinous fluid.            Sensation: L2-S1 normal to light touch bilaterally. No focal deficits noted of the lower extremities.                Motor exam: 5/5 hip flexion, knee flexion and ankle plantar and dorsiflexion bilaterally. No focal weaknesses noted.                                                        Calves supple/nontender.                                                       A/P: 69y Male S/P lumbar laminectomy and fusion - POD#3.      -  Pain control as needed.  - Diet/GI PPx.  - Incentive john.   -  DVT ppx: ASA/SCDs.   -  PT.  -  Weight bearing status: WBAT.  -  Dispo planning.   - Monitor drain.  - case d/w Dr. Reyez.

## 2023-01-26 NOTE — DISCHARGE NOTE NURSING/CASE MANAGEMENT/SOCIAL WORK - NSDCPEFALRISK_GEN_ALL_CORE
For information on Fall & Injury Prevention, visit: [email or web address] OR  [email or web address] OR  https://www.cdc.gov/steadi/patient.html

## 2023-01-26 NOTE — DISCHARGE NOTE NURSING/CASE MANAGEMENT/SOCIAL WORK - PATIENT PORTAL LINK FT
You can access the FollowMyHealth Patient Portal offered by [facility] by registering at the following website: [email or web address]. By joining [facility]’s FollowMyHealth portal, you will also be able to view your health information using other applications (apps) compatible with our system.

## 2023-01-31 NOTE — CDI QUERY NOTE - NSCDIOTHERTXTBX_GEN_ALL_CORE_HH
1/17 HIE document- History of Present Illness   Patient presents for surgical conversation with regard to L4-5 and L5-S1 lumbar degenerative disc disease spinal stenosis patient states his low back pain and neurogenic claudication are equal I discussed openly with this patient that this would require a fusion to address back pain as well as a laminectomy to address his neurogenic claudication he again states this is an equal distribution of low back and leg pain.     1/23 OR report- POSTOPERATIVE DIAGNOSES:    Lumbar spondylosis L4-L5 and L5-S1.  Lumbar stenosis with neurogenic claudication.    OPERATION:    Lumbar instrumented fusion with pedicle screw/posterior instrumented fusion at L4, L5, and S1 for two motion segments.  Placement of interbody biomechanical graft with arthrodesis and two motion segments L45 and L5S1.  Complete lumbar laminectomy in a Guy fashion at L4, L5 with complete removal of the inferior facets.  Removal approximately 50% of the spinous process and lamina of L3 with bilateral hemilaminotomies.  Insertion of biomechanical interbody/biomechanical graft with arthrodesis L4-5, L5-S1.  Posterolateral fusion with allograft and autograft across two motion segments L4, L5, and S1     This provided direct access to the nerve root for complete decompression as well as access to the disc space for placement of interbody biomechanical graft with arthrodesis.  All cauda quinine/lumbar sacral neurologic elements were very satisfactorily decompressed at this point in time.     Is there a diagnosis associated with the lumbar laminectomy at L4 and L5?    -Lumbar laminectomy at L4 and L5 was performed for suspected cauda equina compression  -The patient did not have cauda equina compression  -Other  -Not clinically significant

## 2023-02-02 ENCOUNTER — APPOINTMENT (OUTPATIENT)
Dept: ORTHOPEDIC SURGERY | Facility: CLINIC | Age: 58
End: 2023-02-02
Payer: COMMERCIAL

## 2023-02-02 PROCEDURE — 72100 X-RAY EXAM L-S SPINE 2/3 VWS: CPT

## 2023-02-02 PROCEDURE — 99024 POSTOP FOLLOW-UP VISIT: CPT

## 2023-02-02 NOTE — HISTORY OF PRESENT ILLNESS
[3] : the patient reports pain that is 3/10 in severity [Chills] : no chills [Constipation] : no constipation [Diarrhea] : no diarrhea [Dysuria] : no dysuria [Fever] : no fever [Nausea] : no nausea [Vomiting] : no vomiting [de-identified] : Lumbar instrumented fusion with pedicle screw/posterior instrumented fusion at L4, L5, and S1 for two motion segments.\par  Placement of interbody biomechanical graft with arthrodesis and two motion segments L45 and L5S1.\par  Complete lumbar laminectomy in a Guy fashion at L4, L5 with complete removal of the inferior facets.\par  Removal approximately 50% of the spinous process and lamina of L3 with bilateral hemilaminotomies.\par  Insertion of biomechanical interbody/biomechanical graft with arthrodesis L4-5, L5-S1.\par  Posterolateral fusion with allograft and autograft across two motion segments L4, L5, and S1\par DOS: 01/23/2023 [de-identified] : Patient is a little more than 1 week status post two-level lumbar laminectomy and fusion.  He has some discomfort of his low back but it is getting better every day.  Lower extremities he has no more neurogenic claudication type symptoms and is very happy with the outcome of his surgery.  Denies fever chills night sweats or decrease in appetite.  He is taking only Tylenol as needed for pain is not taking any narcotics.  He is not taking muscle relaxants.  He would like to know if he can drive.  No incisional complaints.  He is accompanied by his wife. [de-identified] : CONSTITUTIONAL: The patient is a very pleasant individual who is well-nourished and who appears stated age.  He is ambulating without an assistive device.\par PSYCHIATRIC: The patient is alert and oriented X 3 and in no apparent distress, and participates with orthopedic evaluation well.\par HEAD: Atraumatic and is nonsyndromic in appearance.\par EENT: No visible thyromegaly, EOMI.\par RESPIRATORY: Respiratory rate is regular, not dyspneic on examination.\par LYMPHATICS: There is no inguinal lymphadenopathy\par INTEGUMENTARY: Skin is clean, dry, and intact about the bilateral lower extremities and lumbar spine.  Lumbar incision is healing well without any erythema discharge calor rubor.  Dressing was removed cleansed with chlorhexidine reinforced with Dermabond Steri-Strips and a dry sterile dressing was placed.\par VASCULAR: There is brisk capillary refill about the bilateral lower extremities.\par NEUROLOGIC: There are no pathologic reflexes. There is no decrease in sensation of the bilateral lower extremities on Wartenberg pinwheel examination.\par MUSCULOSKELETAL: There is no visible muscular atrophy. Manual motor strength is well maintained in the bilateral lower extremities.  She is ambulating freely. [de-identified] : X-ray done today AP lateral of the lumbar spine shows a two-level lumbar laminectomy and fusion construct without any acute spondylolisthesis above or below the construct. [de-identified] : Status post lumbar laminectomy and fusion doing well [de-identified] : Light aerobic activity is encouraged.  Refrain from repetitive bending lifting or twisting.  Incisional care was discussed at length.  He can shower from the front until postop week 3.  He should continue aspirin 325 mg once daily for 28 days postoperatively after which  time he can reduce to baby aspirin.  Heat, topicals, ice as needed pain.  Tylenol as needed pain.  Return to office in 3 weeks or as needed for repeat clinical exam.

## 2023-03-03 ENCOUNTER — APPOINTMENT (OUTPATIENT)
Dept: ORTHOPEDIC SURGERY | Facility: CLINIC | Age: 58
End: 2023-03-03
Payer: COMMERCIAL

## 2023-03-03 PROCEDURE — 72100 X-RAY EXAM L-S SPINE 2/3 VWS: CPT

## 2023-03-03 PROCEDURE — 99024 POSTOP FOLLOW-UP VISIT: CPT

## 2023-03-03 NOTE — HISTORY OF PRESENT ILLNESS
[___ Weeks Post Op] : [unfilled] weeks post op [1] : the patient reports pain that is 1/10 in severity [Clean/Dry/Intact] : clean, dry and intact [Healed] : healed [Neuro Intact] : an unremarkable neurological exam [Vascular Intact] : ~T peripheral vascular exam normal [Negative Eriberto's] : maneuvers demonstrated a negative Eriberto's sign [Xray (Date:___)] : [unfilled] Xray -  [Chills] : no chills [Fever] : no fever [Erythema] : not erythematous [Discharge] : absent of discharge [Swelling] : not swollen [Dehiscence] : not dehisced [Doing Well] : is doing well [No Sign of Infection] : is showing no signs of infection [Adequate Pain Control] : has adequate pain control [de-identified] :  Lumbar instrumented fusion with pedicle screw/posterior instrumented fusion at L4, L5, and S1 for two motion segments.\par  Placement of interbody biomechanical graft with arthrodesis and two motion segments L45 and L5S1.\par  Complete lumbar laminectomy in a Guy fashion at L4, L5 with complete removal of the inferior facets.\par  Removal approximately 50% of the spinous process and lamina of L3 with bilateral hemilaminotomies.\par  Insertion of biomechanical interbody/biomechanical graft with arthrodesis L4-5, L5-S1.\par  Posterolateral fusion with allograft and autograft across two motion segments L4, L5, and S1\par DOS: 01/23/2023 [de-identified] : Patient is approximately 5 weeks status post two-level lumbar instrumented fusion at 45 51.  Presents for postop follow-up.  Patient states neurologically he is completely improved very mild mechanically orientated so postoperative pain but otherwise very happy with his outcome. 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

## 2023-03-31 ENCOUNTER — APPOINTMENT (OUTPATIENT)
Dept: ORTHOPEDIC SURGERY | Facility: CLINIC | Age: 58
End: 2023-03-31
Payer: COMMERCIAL

## 2023-03-31 PROCEDURE — 99024 POSTOP FOLLOW-UP VISIT: CPT

## 2023-03-31 PROCEDURE — 72100 X-RAY EXAM L-S SPINE 2/3 VWS: CPT

## 2023-03-31 NOTE — HISTORY OF PRESENT ILLNESS
[___ Months Post Op] : [unfilled] months post op [1] : the patient reports pain that is 1/10 in severity [Fever] : no fever [Clean/Dry/Intact] : clean, dry and intact [Healed] : healed [Erythema] : not erythematous [Discharge] : absent of discharge [Swelling] : not swollen [Dehiscence] : not dehisced [Neuro Intact] : an unremarkable neurological exam [Vascular Intact] : ~T peripheral vascular exam normal [Negative Eriberto's] : maneuvers demonstrated a negative Eriberto's sign [de-identified] :  Lumbar instrumented fusion with pedicle screw/posterior instrumented fusion at L4, L5, and S1 for two motion segments.\par  Placement of interbody biomechanical graft with arthrodesis and two motion segments L45 and L5S1.\par  Complete lumbar laminectomy in a Guy fashion at L4, L5 with complete removal of the inferior facets.\par  Removal approximately 50% of the spinous process and lamina of L3 with bilateral hemilaminotomies.\par  Insertion of biomechanical interbody/biomechanical graft with arthrodesis L4-5, L5-S1.\par  Posterolateral fusion with allograft and autograft across two motion segments L4, L5, and S1\par DOS: 01/23/2023 [de-identified] : Patient is 2 months postop he states his neurogenic claudication is completely resolved states he does have some mechanically orientated low back pain overall he states is about 80% improvement [de-identified] : Patient is doing well no acute neurologic Endor sensory/motor deficits overall very happy with his outcome [de-identified] : X-rays of the lumbar spine 2 views taken on today's date of March 31, 2023 demonstrates well-positioned implants at 4 5 and 5 1. 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 this coming Monday slow purposeful movements lumbar brace is recommended.  Patient is going to follow-up in 2 months recommending core strengthening topical modalities and aerobic conditioning

## 2023-04-19 NOTE — PHYSICAL THERAPY INITIAL EVALUATION ADULT - SIT-TO-STAND BALANCE
Protopic Pregnancy And Lactation Text: This medication is Pregnancy Category C. It is unknown if this medication is excreted in breast milk when applied topically. fair minus

## 2023-06-09 ENCOUNTER — APPOINTMENT (OUTPATIENT)
Dept: ORTHOPEDIC SURGERY | Facility: CLINIC | Age: 58
End: 2023-06-09

## 2023-07-11 DIAGNOSIS — Z86.39 PERSONAL HISTORY OF OTHER ENDOCRINE, NUTRITIONAL AND METABOLIC DISEASE: ICD-10-CM

## 2023-07-11 DIAGNOSIS — I77.9 DISORDER OF ARTERIES AND ARTERIOLES, UNSPECIFIED: ICD-10-CM

## 2023-07-11 DIAGNOSIS — I65.29 OCCLUSION AND STENOSIS OF UNSPECIFIED CAROTID ARTERY: ICD-10-CM

## 2023-07-21 NOTE — ASU PATIENT PROFILE, ADULT - NS SC CAGE ALCOHOL CUT DOWN
TRANSPLANT NEPHROLOGY CONSULT    Chief Complaint   Evaluate patient for potential combined heart / kidney transplant  No chief complaint on file.    History of Present Illness   60 year old  female, with hist of biventricular heart failure s/p ICD w/ RA lead fracture requiring RA/RV lead extraction c/b RV lead perforation requiring repeat extraction and replacement, pAfib s/p ablation, h/o VT s/p DCCT and ablation who was admitted on 7/16 from [facility] for evaluation of advanced heart therapies. Denies any known CKD. Does report in 2014, needing a few sessions of dialysis for volume removal. States that she recovered completely.   Creat on 11/3/2018 was 0.93 and on admission on 7/15/23 was 2.2, with eGFR of 25ml/min.   7/16/23, Creat 1.94 with eGFR 29ml/min  7/17/23 Creat 2.15, with eGFR 25ml/min, Na 117  Started on CRRT for volume removal on 7/17/23, being transitioned to HD  Also has IABP placed 7/17/23. On bumex, dobutamine, milrinone, levophed, vasopressin    Review of Systems:  Review of Systems  Review of system: Comprehensive and complete review of system was performed and negative for any other complaints, besides those mentioned above.     History:  Past Medical History:   Diagnosis Date   • Congestive heart failure (CMD)    • Dilated cardiomyopathy (CMD)    • Fracture of atrial electrode lead of implantable cardioverter-defibrillator (ICD)     RA lead fracture requiring RA/RV lead extraction   • Paroxysmal A-fib (CMD)    • Ventricular tachycardia (CMD)    • VT (ventricular tachycardia) (CMD)      Past Surgical History:   Procedure Laterality Date   • Cardiac device check - in clinic       dual chamber ICD 2014   • Ep ablation      6/1/17    • Ep study/possible vt ablation  01/26/2023   • Lead extraction  2021    RA lead fracture requiring RA/RV lead extraction c/b RV lead perforation requiring repeat extraction & replacement     Social History     Tobacco Use   • Smoking status:  Former     Current packs/day: 0.00     Types: Cigarettes     Passive exposure: Past   • Tobacco comments:      Never used tobacco. denies smoking.      Family History   Problem Relation Age of Onset   • Coronary Artery Disease Other         Significant for premature CAD   • Aneurysm Neg Hx         Negative for AAA       ALLERGIES:   Allergen Reactions   • Metronidazole ANAPHYLAXIS   • Sacubitril-Valsartan ANAPHYLAXIS     Angioedema, mouth and tongue swelling and difficulty swallowing   • Sulfa Antibiotics ANAPHYLAXIS            • Ace Inhibitors HIVES   • Amiodarone Other (See Comments)     Elongated QT interval   • Heparin Other (See Comments)     History of HIT     Current Facility-Administered Medications   Medication Dose Route Frequency Provider Last Rate Last Admin   • DEXTROSE 5 % IV SOLN Pyxis Override            • sodium PHOSPHATE 15 mmol in sodium chloride 0.9 % 250 mL IVPB  15 mmol Intravenous Once Jeannie De Los Santos MD 83.3 mL/hr at 07/21/23 1335 15 mmol at 07/21/23 1335   • sodium chloride 0.9% infusion   Intravenous Continuous PRN David Espinal MD       • DOBUTamine (DOBUTREX) 1,000 mg/250 mL in dextrose 5 % infusion  5 mcg/kg/min (Dosing Weight) Intravenous Continuous Khanh Fiore MD 4.6 mL/hr at 07/21/23 1259 5 mcg/kg/min at 07/21/23 1259   • NORepinephrine (LEVOPHED) 32 mg/250mL sod chl 0.9 % infusion (alt diluent)  0-100 mcg/min Intravenous Continuous Khanh Fiore MD 4.22 mL/hr at 07/21/23 1259 9 mcg/min at 07/21/23 1259   • sodium chloride (NORMAL SALINE) 0.9 % bolus 100-200 mL  100-200 mL Intravenous PRN Jeannie De Los Santos MD       • sodium chloride (PF) 0.9 % injection 10 mL  10 mL Intracatheter PRN Jeannie De Los Santos MD       • cefTRIAXone (ROCEPHIN) syringe 2,000 mg  2,000 mg Intravenous Daily Alphonse Pereyra MD   2,000 mg at 07/21/23 0945   • alteplase (CATHFLO ACTIVASE) injection 2 mg  2 mg Intracatheter PRN Jeannie De Los Santos MD   2 mg at 07/19/23 1904   •  HYDROcodone-acetaminophen (NORCO) 5-325 MG per tablet 1 tablet  1 tablet Oral Q4H PRN Jane Duncan MD       • alteplase (CATHFLO ACTIVASE) injection 2 mg  2 mg Intracatheter PRN Jeannie De Los Santos MD   2 mg at 07/18/23 1615   • ondansetron (ZOFRAN) injection 4 mg  4 mg Intravenous Q12H PRN Diego Kaufman MD   4 mg at 07/20/23 1033   • potassium phosphate 20 mmol in sodium chloride 0.9 % 250 mL IVPB  20 mmol Intravenous Q12H PRN Jeannie De Los Santos MD        Or   • sodium PHOSPHATE 20 mmol in sodium chloride 0.9 % 250 mL IVPB  20 mmol Intravenous Q12H PRN Jeannie De Los Santos MD       • magnesium sulfate 2 g in 50 mL premix IVPB  2 g Intravenous Q12H PRN Jeannie De Los Santos MD       • potassium CHLORIDE 20 MEQ/50ML IVPB premix 20 mEq  20 mEq Intravenous PRN Jeannie De Los Santos MD   Completed at 07/21/23 0756   • potassium CHLORIDE 20 MEQ/50ML IVPB premix 20 mEq  20 mEq Intravenous PRN Jeannie De Los Santos MD   Completed at 07/20/23 1747   • sodium bicarbonate 8.4 % injection 50 mEq  50 mEq Intravenous PRN Jeannie De Los Santos MD       • sodium chloride (PF) 0.9 % injection 10 mL  10 mL Intracatheter PRN Jeannie De Los Santos MD       • sodium chloride (NORMAL SALINE) 0.9 % bolus 1,000 mL  1,000 mL CRRT PRN Jeannie De Los Santos MD       • calcium gluconate 1 g in sodium chloride 50 mL IVPB  1 g Intravenous PRN Jeannie De Los Santos MD       • Standard Replacement Fluid with Calcium, Potassium Chloride 4 mEq/L (PRISMASOL BGK 4/2.5)   CRRT Continuous Jeannie De Los Santos MD 1,000 mL/hr at 07/19/23 2016 New Bag at 07/19/23 2016   • sodium chloride 0.9% infusion   Intravenous Continuous David Espinal MD 3 mL/hr at 07/21/23 1259 Rate Verify at 07/21/23 1259   • sodium chloride 0.9% infusion   Intravenous Continuous David Espinal MD 3 mL/hr at 07/21/23 1259 Rate Verify at 07/21/23 1259   • heparin 2 unit/mL in NaCL 0.9% solution  3 mL/hr Other Continuous Cross, David PASTRANA MD 3 mL/hr at 07/21/23 1259 3 mL/hr at 07/21/23 1259   • insulin lispro (ADMELOG,HumaLOG) - Correction Dose    Subcutaneous 4 times per day Diego Kaufman MD   1 Units at 07/20/23 2200   • digoxin (LANOXIN) tablet 62.5 mcg  62.5 mcg Oral Every Other Day Diego Kaufman MD   62.5 mcg at 07/20/23 0933   • sodium chloride (PF) 0.9 % injection 10 mL  10 mL Injection PRN Diego Kaufman MD       • sodium chloride (PF) 0.9 % injection 10 mL  10 mL Injection PRN Diego Kaufman MD       • sodium chloride (PF) 0.9 % injection 20 mL  20 mL Injection PRN Diego Kaufman MD       • AMIODarone (PACERONE) tablet 200 mg  200 mg Oral Daily Diego Kaufman MD   200 mg at 07/21/23 0942   • bumetanide (BUMEX) 12.5 mg/50 mL infusion adult or ped > 15 kg (0.25 mg/mL)  3 mg/hr Intravenous Continuous Diego Kaufman MD 12 mL/hr at 07/21/23 1259 3 mg/hr at 07/21/23 1259   • milrinone (PRIMACOR) 20 mg/100 mL in dextrose 5 % infusion premix  0.25 mcg/kg/min (Dosing Weight) Intravenous Continuous Diego Kaufman MD 4.6 mL/hr at 07/21/23 1259 0.25 mcg/kg/min at 07/21/23 1259   • sodium chloride 0.9% infusion   Intravenous Continuous Diego Kaufman MD 20 mL/hr at 07/21/23 1259 Rate Verify at 07/21/23 1259   • sodium chloride 0.9% infusion   Intravenous Continuous Diego Kaufman MD 10 mL/hr at 07/21/23 1259 Rate Verify at 07/21/23 1259   • heparin (porcine) 25,000 units/250 mL in dextrose 5 % infusion  1-30 Units/kg/hr (Dosing Weight) Intravenous Continuous Diego Kaufman MD   Completed at 07/18/23 0838   • heparin (porcine) injection 3,700 Units  60 Units/kg (Dosing Weight) Intravenous PRN Diego Kaufman MD       • heparin (porcine) injection 1,800 Units  30 Units/kg (Dosing Weight) Intravenous PRN Diego Kaufman MD       • sodium chloride 0.9 % flush bag 25 mL  25 mL Intravenous PRN Diego Kaufman MD       • sodium chloride (PF) 0.9 % injection 2 mL  2 mL Intracatheter 2 times per day Diego Kaufman MD   2 mL at 07/20/23 2100   • sodium chloride (NORMAL SALINE) 0.9 % bolus 500 mL  500 mL Intravenous PRN Diego Kaufman MD       • dextrose 50 % injection 25 g   25 g Intravenous PRN Diego Kaufman MD       • dextrose 50 % injection 12.5 g  12.5 g Intravenous PRN Diego Kaufman MD       • glucagon (GLUCAGEN) injection 1 mg  1 mg Intramuscular PRN Diego Kaufman MD       • dextrose (GLUTOSE) 40 % gel 15 g  15 g Oral PRN Diego Kaufman MD       • dextrose (GLUTOSE) 40 % gel 30 g  30 g Oral PRN Diego Kaufman MD       • famotidine (PEPCID) tablet 20 mg  20 mg Oral QHS Diego Kaufman MD   20 mg at 07/20/23 2105   • dextrose 5 % / sodium chloride 0.45% infusion   Intravenous Continuous Diego Kaufman MD 10 mL/hr at 07/21/23 1259 Rate Verify at 07/21/23 1259   • atorvastatin (LIPITOR) tablet 40 mg  40 mg Oral Nightly Diego Kaufman MD   40 mg at 07/18/23 2111   • melatonin tablet 6 mg  6 mg Oral Nightly Diego Kaufman MD   6 mg at 07/20/23 2200   • senna (SENOKOT) 17.2 mg  2 tablet Oral Nightly Diego Kaufman MD   17.2 mg at 07/15/23 2219   • PRENATAL vitamin & mineral w/ folic acid 1 mg tablet 1 tablet  1 tablet Oral Daily Diego Kaufman MD   1 tablet at 07/21/23 0943   • vasopressin (VASOSTRICT) 20 unit/100 mL dextrose 5 % infusion  0-0.1 Units/min Intravenous Continuous Diego Kaufman MD 6 mL/hr at 07/21/23 1259 0.02 Units/min at 07/21/23 1259       OBJECTIVE     Visit Vitals  /70 (BP Location: LUE - Left upper extremity, Patient Position: Semi-Mcclellan's)   Pulse 99   Temp 97.7 °F (36.5 °C)   Resp 15   Ht 5' 2.01\" (1.575 m)   Wt 66.2 kg (145 lb 15.1 oz)   SpO2 97%   BMI 26.69 kg/m²             Intake/Output Summary (Last 24 hours) at 7/21/2023 1420  Last data filed at 7/21/2023 1359  Gross per 24 hour   Intake 3718.69 ml   Output 4067 ml   Net -348.31 ml       Physical Exam:  Physical Exam  Constitutional:       Appearance: She is well-developed.   HENT:      Head: Normocephalic.   Eyes:      Pupils: Pupils are equal, round, and reactive to light.   Neck:      Vascular: No JVD.   Cardiovascular:      Rate and Rhythm: Normal rate.      Heart sounds: No murmur heard.     No  friction rub.      Comments: IABP+  Pulmonary:      Effort: No respiratory distress.      Breath sounds: No wheezing or rales.   Abdominal:      General: Bowel sounds are normal. There is no distension.      Palpations: Abdomen is soft.      Tenderness: There is no abdominal tenderness.   Musculoskeletal:         General: Normal range of motion.      Cervical back: Neck supple.      Right lower leg: Edema present.      Left lower leg: Edema present.   Skin:     Findings: No rash.   Neurological:      Mental Status: She is alert and oriented to person, place, and time.      Deep Tendon Reflexes: Reflexes are normal and symmetric.            Results:  Recent Results (from the past 24 hour(s))   Partial Thromboplastin Time    Collection Time: 07/20/23  3:36 PM   Result Value Ref Range    PTT 54 (H) 22 - 30 sec   Basic Metabolic Panel    Collection Time: 07/20/23  3:36 PM   Result Value Ref Range    Fasting Status      Sodium 129 (L) 135 - 145 mmol/L    Potassium 3.7 3.4 - 5.1 mmol/L    Chloride 100 97 - 110 mmol/L    Carbon Dioxide 22 21 - 32 mmol/L    Anion Gap 11 7 - 19 mmol/L    Glucose 141 (H) 70 - 99 mg/dL    BUN 7 6 - 20 mg/dL    Creatinine 0.94 0.51 - 0.95 mg/dL    Glomerular Filtration Rate 69 >=60    BUN/Cr 7 7 - 25    Calcium 8.1 (L) 8.4 - 10.2 mg/dL   Fibrinogen Activity    Collection Time: 07/20/23  3:36 PM   Result Value Ref Range    Fibrinogen 78 (LL) 190 - 425 mg/dL   Rubella Antibody IgG    Collection Time: 07/20/23  3:36 PM   Result Value Ref Range    Rubella Antibody, IgG >500.0 >9.9 Units/mL   Rubeola Immunity IgG    Collection Time: 07/20/23  3:36 PM   Result Value Ref Range    Rubeola Immunity, IgG (Immune Status) Immune Immune    Rubeola Immunity, IgG (Numeric) >300.0 >=16.5 AU/mL   Toxoplasma Antibody IgG    Collection Time: 07/20/23  3:36 PM   Result Value Ref Range    Toxoplasma Antibody, IgG <0.50 <=6.40 Units/mL   Varicella Zoster Immunity IgG    Collection Time: 07/20/23  3:36 PM   Result  Value Ref Range    Varicella Zoster Antibody IgG (Immune Status) Immune Immune    Varicella Zoster Antibody IgG (Numeric) >4,000.0 >=165.0 Index   HIV 1/HIV 2 Antigen/Antibody Screen    Collection Time: 07/20/23  3:36 PM   Result Value Ref Range    HIV Antigen/ Antibody Combo Screen Nonreactive Nonreactive   Hepatitis C Antibody With Reflex    Collection Time: 07/20/23  3:36 PM   Result Value Ref Range    Hepatitis C Antibody Negative Negative   Hepatitis B Surface Antigen    Collection Time: 07/20/23  3:36 PM   Result Value Ref Range    Hepatitis B Surface Antigen Negative Negative   Hepatitis B Surface Antibody    Collection Time: 07/20/23  3:36 PM   Result Value Ref Range    Hepatitis B Surface Antibody Positive    Hepatitis B Core Total Antibody    Collection Time: 07/20/23  3:36 PM   Result Value Ref Range    Hepatitis B Core Antibody, IgG And IgM Negative Negative   Hepatitis A IgG And IgM Screen    Collection Time: 07/20/23  3:36 PM   Result Value Ref Range    Hepatitis A Antibody, IgG And IgM Negative Negative   CMV Antibody IgG & IgM With Reflex    Collection Time: 07/20/23  3:36 PM   Result Value Ref Range    Cytomegalovirus Antibody, IgM 0.05 <=0.90 OD Ratio    Cytomegalovirus Antibody, IgG 0.23 <=0.90 ISR   Vitamin D -25 Hydroxy    Collection Time: 07/20/23  3:36 PM   Result Value Ref Range    Vitamin D, 25-Hydroxy 42.8 30.0 - 100.0 ng/mL   Thyroid Stimulating Hormone    Collection Time: 07/20/23  3:36 PM   Result Value Ref Range    TSH 1.554 0.350 - 5.000 mcUnits/mL   Free T3    Collection Time: 07/20/23  3:36 PM   Result Value Ref Range    T3, Free 1.1 (L) 2.2 - 4.0 pg/mL   Phosphorus    Collection Time: 07/20/23  3:36 PM   Result Value Ref Range    Phosphorus 3.4 2.4 - 4.7 mg/dL   Free T4    Collection Time: 07/20/23  3:36 PM   Result Value Ref Range    T4, Free 1.7 (H) 0.8 - 1.5 ng/dL   GLUCOSE, BEDSIDE - POINT OF CARE    Collection Time: 07/20/23  5:31 PM   Result Value Ref Range    GLUCOSE, BEDSIDE  - POINT OF CARE 112 (H) 70 - 99 mg/dL   CBC No Differential    Collection Time: 07/20/23  5:35 PM   Result Value Ref Range    WBC 10.5 4.2 - 11.0 K/mcL    RBC 2.92 (L) 4.00 - 5.20 mil/mcL    HGB 8.1 (L) 12.0 - 15.5 g/dL    HCT 25.6 (L) 36.0 - 46.5 %    MCV 87.7 78.0 - 100.0 fl    MCH 27.7 26.0 - 34.0 pg    MCHC 31.6 (L) 32.0 - 36.5 g/dL    PLT 15 (LL) 140 - 450 K/mcL    RDW-CV 18.8 (H) 11.0 - 15.0 %    RDW-SD 59.9 (H) 39.0 - 50.0 fL    NRBC 1 (H) <=0 /100 WBC   Glycohemoglobin    Collection Time: 07/20/23  5:35 PM   Result Value Ref Range    Hemoglobin A1C 5.5 4.5 - 5.6 %   GLUCOSE, BEDSIDE - POINT OF CARE    Collection Time: 07/20/23  9:58 PM   Result Value Ref Range    GLUCOSE, BEDSIDE - POINT OF CARE 159 (H) 70 - 99 mg/dL   Basic Metabolic Panel    Collection Time: 07/20/23 10:09 PM   Result Value Ref Range    Fasting Status      Sodium 126 (L) 135 - 145 mmol/L    Potassium 3.9 3.4 - 5.1 mmol/L    Chloride 98 97 - 110 mmol/L    Carbon Dioxide 22 21 - 32 mmol/L    Anion Gap 10 7 - 19 mmol/L    Glucose 174 (H) 70 - 99 mg/dL    BUN 6 6 - 20 mg/dL    Creatinine 1.04 (H) 0.51 - 0.95 mg/dL    Glomerular Filtration Rate 61 >=60    BUN/Cr 6 (L) 7 - 25    Calcium 7.9 (L) 8.4 - 10.2 mg/dL   Magnesium    Collection Time: 07/21/23  4:00 AM   Result Value Ref Range    Magnesium 2.2 1.7 - 2.4 mg/dL   Phosphorus    Collection Time: 07/21/23  4:00 AM   Result Value Ref Range    Phosphorus 2.6 2.4 - 4.7 mg/dL   Partial Thromboplastin Time (PTT)    Collection Time: 07/21/23  4:00 AM   Result Value Ref Range    PTT 46 (H) 22 - 30 sec   Hepatitis Serology Panel Acute with Reflex HCV PCR    Collection Time: 07/21/23  4:00 AM   Result Value Ref Range    Hepatitis A Antibody, IgM Negative Negative    Hepatitis A Interpretation      Hepatitis B Surface Antigen Negative Negative    Hepatitis B Core Antibody, IgM Negative Negative    Hepatitis B Interpretation      Hepatitis C Antibody Negative Negative   Fibrinogen Activity    Collection  Time: 07/21/23  4:00 AM   Result Value Ref Range    Fibrinogen 64 (LL) 190 - 425 mg/dL   CBC No Differential    Collection Time: 07/21/23  4:00 AM   Result Value Ref Range    WBC 8.4 4.2 - 11.0 K/mcL    RBC 3.00 (L) 4.00 - 5.20 mil/mcL    HGB 8.3 (L) 12.0 - 15.5 g/dL    HCT 26.2 (L) 36.0 - 46.5 %    MCV 87.3 78.0 - 100.0 fl    MCH 27.7 26.0 - 34.0 pg    MCHC 31.7 (L) 32.0 - 36.5 g/dL    PLT 18 (LL) 140 - 450 K/mcL    RDW-CV 18.9 (H) 11.0 - 15.0 %    RDW-SD 60.4 (H) 39.0 - 50.0 fL    NRBC 1 (H) <=0 /100 WBC   Cortisol, AM    Collection Time: 07/21/23  4:00 AM   Result Value Ref Range    Cortisol, AM 21.0 5.2 - 22.5 mcg/dL   Comprehensive Metabolic Panel    Collection Time: 07/21/23  4:00 AM   Result Value Ref Range    Fasting Status      Sodium 130 (L) 135 - 145 mmol/L    Potassium 3.8 3.4 - 5.1 mmol/L    Chloride 101 97 - 110 mmol/L    Carbon Dioxide 23 21 - 32 mmol/L    Anion Gap 10 7 - 19 mmol/L    Glucose 119 (H) 70 - 99 mg/dL    BUN 6 6 - 20 mg/dL    Creatinine 0.91 0.51 - 0.95 mg/dL    Glomerular Filtration Rate 71 >=60    BUN/Cr 7 7 - 25    Calcium 8.0 (L) 8.4 - 10.2 mg/dL    Bilirubin, Total 0.8 0.2 - 1.0 mg/dL    GOT/AST 30 <=37 Units/L    GPT/ALT 95 (H) <64 Units/L    Alkaline Phosphatase 139 (H) 45 - 117 Units/L    Albumin 3.0 (L) 3.6 - 5.1 g/dL    Protein, Total 5.7 (L) 6.4 - 8.2 g/dL    Globulin 2.7 2.0 - 4.0 g/dL    A/G Ratio 1.1 1.0 - 2.4   NT proBNP    Collection Time: 07/21/23  4:00 AM   Result Value Ref Range    NT-proBNP 10,056 (H) <=125 pg/mL   Prothrombin Time (INR/PT)    Collection Time: 07/21/23  4:00 AM   Result Value Ref Range    Protime- PT 15.6 (H) 9.7 - 11.8 sec    INR 1.5     BLOOD GAS, MIXED VENOUS WITH COOXIMETRY - RESPIRATORY    Collection Time: 07/21/23  4:07 AM   Result Value Ref Range    CONDITION - RESPIRATORY ;ROOM AIR     CONDITION - RESPIRATORY      FIO2 - RESPIRATORY      HEMOGLOBIN - RESPIRATORY 8.3 (L) 12.0 - 15.5 g/dL    BASE EXCESS / DEFICIT, MIXED VENOUS - RESPIRATORY -2  mmol/L    CARBOXYHEMOGLOBIN, MIXED VENOUS - RESPIRATORY 1 %    HCO3, MIXED VENOUS - RESPIRATORY 23 mmol/L    METHEMOGLOBIN - RESPIRATORY 0.0 <=1.6 %    OXYHEMOGLOBIN, MIXED VENOUS - RESPIRATORY 46.5 %    O2 CONTENT, MIXED VENOUS - RESPIRATORY 6 %    PCO2, MIXED VENOUS - RESPIRATORY 36 mm Hg    PH, MIXED VENOUS - RESPIRATORY 7.40 Units    PO2, MIXED VENOUS - RESPIRATORY 32 mm Hg    O2 SATURATION, MIXED VENOUS - RESPIRATORY 47 %    SITE - RESPIRATORY Venous Line     TEMPERATURE - RESPIRATORY 36.0 degrees   POTASSIUM - RESPIRATORY    Collection Time: 07/21/23  4:07 AM   Result Value Ref Range    POTASSIUM - RESPIRATORY 3.7 3.4 - 5.1 mmol/L   SODIUM - RESPIRATORY    Collection Time: 07/21/23  4:07 AM   Result Value Ref Range    SODIUM - RESPIRATORY 129 (L) 135 - 145 mmol/L   CALCIUM, IONIZED - RESPIRATORY    Collection Time: 07/21/23  4:07 AM   Result Value Ref Range    CALCIUM, IONIZED - RESPIRATORY 1.13 (L) 1.15 - 1.29 mmol/L   Phosphorus    Collection Time: 07/21/23 10:23 AM   Result Value Ref Range    Phosphorus 2.1 (L) 2.4 - 4.7 mg/dL   Basic Metabolic Panel    Collection Time: 07/21/23 10:23 AM   Result Value Ref Range    Fasting Status      Sodium 128 (L) 135 - 145 mmol/L    Potassium 4.0 3.4 - 5.1 mmol/L    Chloride 101 97 - 110 mmol/L    Carbon Dioxide 22 21 - 32 mmol/L    Anion Gap 9 7 - 19 mmol/L    Glucose 185 (H) 70 - 99 mg/dL    BUN 6 6 - 20 mg/dL    Creatinine 0.94 0.51 - 0.95 mg/dL    Glomerular Filtration Rate 69 >=60    BUN/Cr 6 (L) 7 - 25    Calcium 8.0 (L) 8.4 - 10.2 mg/dL   Magnesium    Collection Time: 07/21/23 10:23 AM   Result Value Ref Range    Magnesium 2.1 1.7 - 2.4 mg/dL   GLUCOSE, BEDSIDE - POINT OF CARE    Collection Time: 07/21/23  1:07 PM   Result Value Ref Range    GLUCOSE, BEDSIDE - POINT OF CARE 153 (H) 70 - 99 mg/dL       XR CHEST AP OR PA   Final Result   Impression:    No significant interval change. Support devices as above.      Electronically Signed by: ALLYSON EDMOND MD     Signed on: 7/21/2023 7:52 AM    Workstation ID: [id number]      XR CHEST AP OR PA   Final Result      XR CHEST AP OR PA   Final Result   No significant interval change.      Electronically Signed by: CARLOS GREENFIELD M.D.    Signed on: 7/19/2023 6:49 AM    Workstation ID: [id number]      XR CHEST AP OR PA   Final Result   Impression:    Stable exam as above. Support devices as above.      Electronically Signed by: AVTAR ALVAREZ MD    Signed on: 7/18/2023 7:17 AM    Workstation ID: [id number]      XR CHEST AP OR PA   Final Result   Impression:    1.   Placement of left IJ central venous catheter with the tip likely near   the mid SVC, but obscured of the [place].   2.   [place] catheter tip remains in the distal right pulmonary artery.   3.   Intra-aortic radiopaque balloon pump marker overlies the proximal   descending thoracic aorta.   4.   Increase in size of moderate right pleural effusion.      Electronically Signed by: ALLSYON EDMOND MD    Signed on: 7/17/2023 3:46 PM    Workstation ID: [id number]      Cath/PV Case   Final Result      XR CHEST AP OR PA   Final Result    Moderate cardiomegaly.  Moderate right pleural effusion.  Stable support   devices.            Electronically Signed by: SATYA MURRIETA MD    Signed on: 7/17/2023 8:06 AM    Workstation ID: [id number]      XR CHEST AP OR PA   Final Result      XR CHEST AP OR PA   Final Result   1.    Moderate right pleural effusion       Electronically Signed by: ED ARBOLEDA MD    Signed on: 7/15/2023 3:56 PM    Workstation ID: [id number]       VAS EXTREMITY LOWER VENOUS DUPLEX    (Results Pending)       ASSESSMENT/PLAN  There are no active hospital problems to display for this patient.     No orders of the defined types were placed in this encounter.    60 year old with hist of biventricular heart failure s/p ICD w/ RA lead fracture requiring RA/RV lead extraction c/b RV lead perforation requiring repeat extraction and  replacement, pAfib s/p ablation, h/o VT s/p DCCT and ablation who was admitted on 7/16 from [facility] for evaluation of advanced heart therapies. Denies any known CKD. Does report in 2014, needing a few sessions of dialysis for volume removal. States that she recovered completely.   Creat on 11/3/2018 was 0.93 and on admission on 7/15/23 was 2.2, with eGFR of 25ml/min.   7/16/23, Creat 1.94 with eGFR 29ml/min  7/17/23 Creat 2.15, with eGFR 25ml/min, Na 117  Started on CRRT for volume removal on 7/17/23, being transitioned to HD  From [facility]:   1/26/23 Creat 0.98, eGFR 65  2/2/23 Creat 1.15, eGFR 54  3/21/23 creat 1.63, eGFR 35  4/20/23 Creat 1.67, eGFR 34  5/2/23 creat 1.61, eGFR 36  5/19/23 creat 1.39, eGFR 43  5/27/23, creat 1.77, eGFR 32  6/10/23 Creat 2.1    - Evaluation for potential combined Heart/Kidney/ Advanced heart failure therapies underway. Discussed with pt that her candidacy for potential kidney as well, would be driven by her candidacy for OHT. noted labs, ua on 7/16/23, neg for protein, neg blood, RBC 1-2.  5/9/23 US complete:  The right kidney measures 9.6 cm in length and the left kidney measures 10.0 cm.  The kidneys are normal in size and echogenicity. There is no collecting system dilatation.  There is a 1.6 cm anechoic cyst in the inferior right kidney with a thin avascular septation.      5/8/23 CT A/P: Kidneys: There are no renal or ureteral calculi. There is no hydronephrosis.     Repeat kidney ultrasound ordered  Addendum: 7/22/23: The right kidney measures 7.2 cm in length and the left kidney measures  8.37 m in length.  Increased cortical echogenicity is identified. Chronic medical kidney disease.    Given acuity of elevated creat, recent RRT (predominantly for volume removal), bland ua, normal looking kidneys on imaging in May 2023, doubt CKD. At this time, in my opinion, I do not think patient needs a kidney transplant, in combination with OHT. However noted repeat kidney  ultrasound- smaller kidneys and consistent with CKD. Will need to reassess her kidney function down the road, any potential recovery from renal replacement therapy, degree of recovery if any and reevaluate her candidacy.    Thank you for consulting me and allowing me to participate in the care of this patient. Please feel free to contact me with any questions or concerns.      Patient will be discussed with the multidisciplinary transplant team,     Alee Lund MD, MPH  Medical Director and Primary Transplant Nephrologist  [facility]                     no

## 2023-07-25 ENCOUNTER — APPOINTMENT (OUTPATIENT)
Dept: ORTHOPEDIC SURGERY | Facility: CLINIC | Age: 58
End: 2023-07-25
Payer: COMMERCIAL

## 2023-07-25 ENCOUNTER — APPOINTMENT (OUTPATIENT)
Dept: CARDIOLOGY | Facility: CLINIC | Age: 58
End: 2023-07-25
Payer: COMMERCIAL

## 2023-07-25 VITALS
SYSTOLIC BLOOD PRESSURE: 130 MMHG | HEART RATE: 79 BPM | TEMPERATURE: 98.9 F | HEIGHT: 67 IN | DIASTOLIC BLOOD PRESSURE: 80 MMHG | WEIGHT: 208 LBS | OXYGEN SATURATION: 96 % | BODY MASS INDEX: 32.65 KG/M2

## 2023-07-25 VITALS
DIASTOLIC BLOOD PRESSURE: 65 MMHG | WEIGHT: 208 LBS | SYSTOLIC BLOOD PRESSURE: 101 MMHG | BODY MASS INDEX: 32.65 KG/M2 | HEART RATE: 103 BPM | HEIGHT: 67 IN

## 2023-07-25 DIAGNOSIS — R00.0 TACHYCARDIA, UNSPECIFIED: ICD-10-CM

## 2023-07-25 DIAGNOSIS — R06.00 DYSPNEA, UNSPECIFIED: ICD-10-CM

## 2023-07-25 DIAGNOSIS — I10 ESSENTIAL (PRIMARY) HYPERTENSION: ICD-10-CM

## 2023-07-25 DIAGNOSIS — E78.5 HYPERLIPIDEMIA, UNSPECIFIED: ICD-10-CM

## 2023-07-25 PROCEDURE — 99213 OFFICE O/P EST LOW 20 MIN: CPT

## 2023-07-25 PROCEDURE — 72100 X-RAY EXAM L-S SPINE 2/3 VWS: CPT

## 2023-07-25 PROCEDURE — 99214 OFFICE O/P EST MOD 30 MIN: CPT

## 2023-07-25 NOTE — CARDIOLOGY SUMMARY
[de-identified] : - Hypertension\par - Hypercholesterolemia\par - obesity: BMI 34\par - NUCLEAR STRESS TEST: 6/20/2023, normal, EF>65%, below average exercise tolerance \par - ECHOCARDIOGRAM: 6/22/2023, septal hypertrophy,EF 60%, mild mitral regurgitation, trace tricuspid\par regurgitation RVSP 25 \par - CAROTID ULTRASOUND: 6/22/2023, nonobstructive disease \par

## 2023-07-25 NOTE — CARDIOLOGY SUMMARY
[de-identified] : - Hypertension\par - Hypercholesterolemia\par - obesity: BMI 34\par - NUCLEAR STRESS TEST: 6/20/2023, normal, EF>65%, below average exercise tolerance \par - ECHOCARDIOGRAM: 6/22/2023, septal hypertrophy,EF 60%, mild mitral regurgitation, trace tricuspid\par regurgitation RVSP 25 \par - CAROTID ULTRASOUND: 6/22/2023, nonobstructive disease \par

## 2023-07-25 NOTE — HISTORY OF PRESENT ILLNESS
[de-identified] : Very pleasant 57-year-old gentleman 6 months status post a two-level lumbar instrumented fusion.  Overall he is made a very dramatic and impressive improvement with regard to neurogenic claudication/sciatica.  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

## 2023-07-25 NOTE — DISCUSSION/SUMMARY
[FreeTextEntry1] : 1.  Exertional tachycardia/dyspnea on exertion.\par Probably secondary to the patient's exercise intolerance in view of his echocardiographic and nuclear stress test results.\par Diet, exercise, and weight loss discussed.\par If the patient's symptoms continue despite behavioral modification, we will consider further evaluation at that time.\par 2. Hypertension\par Well-controlled\par 3. Hypercholesterolemia\par Well-controlled/20 4/2023.\par Total cholesterol 153, HDL 48, LDL 85, triglycerides 93\par For carotid artery disease\par Nonobstructive.\par Continue aspirin and statin therapy

## 2023-07-25 NOTE — DISCUSSION/SUMMARY
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  But overall doing very well essentially without complaint patient is can a follow-up in 6 months

## 2023-07-25 NOTE — PHYSICAL EXAM
[de-identified] : CONSTITUTIONAL: The patient is a very pleasant individual who is well-nourished and who appears stated age.  Very pleasant no acute distress accompanied by his wife\par PSYCHIATRIC: The patient is alert and oriented X 3 and in no apparent distress, and participates with orthopedic evaluation well.\par HEAD: Atraumatic and is nonsyndromic in appearance.\par EENT: No visible thyromegaly, EOMI.\par RESPIRATORY: Respiratory rate is regular, not dyspneic on examination.\par LYMPHATICS: There is no inguinal lymphadenopathy\par INTEGUMENTARY: Skin is clean, dry, and intact about the bilateral lower extremities and lumbar spine.\par VASCULAR: There is brisk capillary refill about the bilateral lower extremities.\par NEUROLOGIC: There are no pathologic reflexes. There is no decrease in sensation of the bilateral lower extremities on Wartenberg pinwheel examination. Deep tendon reflexes are well maintained at 2+/4 of the bilateral lower extremities and are symmetric..\par MUSCULOSKELETAL: There is no visible muscular atrophy. Manual motor strength is well maintained in the bilateral lower extremities. Range of motion of lumbar spine is well maintained. The patient ambulates in a non-myelopathic manner. Negative tension sign and straight leg raise bilaterally. Quad extension, ankle dorsiflexion, EHL, plantar flexion, and ankle eversion are well preserved. Normal secondary orthopaedic exam of bilateral hips, greater trochanteric area, knees and ankles [de-identified] : X-rays been reviewed on today's date that shows a stable 4 5 and 5 1 instrumented fusion.  Visual alignment is well-maintained.  Dated the x-ray 2 views July 25, 2023

## 2023-07-25 NOTE — REASON FOR VISIT
[Follow-Up Visit] : a follow-up visit for [Back Pain] : back pain [FreeTextEntry2] : Lumbar fusion using cage DOS: 23-Jan-2023

## 2023-07-25 NOTE — HISTORY OF PRESENT ILLNESS
[FreeTextEntry1] : The patient is a 57-year-old white male with a past medical history remarkable for hypertension, hypercholesterolemia, carotid artery disease, and obesity who presented to the office for evaluation of dyspnea on exertion and exertional tachycardia.\par Echocardiography demonstrated a normal ejection fraction and only mild valve disease.  His nuclear stress test was normal with an ejection fraction of greater than 65%.  His exercise tolerance was below average.  A carotid ultrasound demonstrated nonobstructive disease.  His cholesterol from 4/24/2023 was acceptable.\par The patient reports that he is active at work but does not exercise.  He is chest pain-free.

## 2024-01-25 ENCOUNTER — APPOINTMENT (OUTPATIENT)
Dept: ORTHOPEDIC SURGERY | Facility: CLINIC | Age: 59
End: 2024-01-25
Payer: COMMERCIAL

## 2024-01-25 DIAGNOSIS — Z98.1 ARTHRODESIS STATUS: ICD-10-CM

## 2024-01-25 PROCEDURE — 72100 X-RAY EXAM L-S SPINE 2/3 VWS: CPT

## 2024-01-25 PROCEDURE — 99213 OFFICE O/P EST LOW 20 MIN: CPT

## 2024-07-25 ENCOUNTER — APPOINTMENT (OUTPATIENT)
Dept: CARDIOLOGY | Facility: CLINIC | Age: 59
End: 2024-07-25
Payer: COMMERCIAL

## 2024-07-25 VITALS
WEIGHT: 211 LBS | SYSTOLIC BLOOD PRESSURE: 100 MMHG | OXYGEN SATURATION: 99 % | DIASTOLIC BLOOD PRESSURE: 70 MMHG | BODY MASS INDEX: 33.12 KG/M2 | HEIGHT: 67 IN | HEART RATE: 75 BPM

## 2024-07-25 DIAGNOSIS — I10 ESSENTIAL (PRIMARY) HYPERTENSION: ICD-10-CM

## 2024-07-25 DIAGNOSIS — E78.5 HYPERLIPIDEMIA, UNSPECIFIED: ICD-10-CM

## 2024-07-25 DIAGNOSIS — I77.9 DISORDER OF ARTERIES AND ARTERIOLES, UNSPECIFIED: ICD-10-CM

## 2024-07-25 DIAGNOSIS — E66.9 OBESITY, UNSPECIFIED: ICD-10-CM

## 2024-07-25 PROCEDURE — 99214 OFFICE O/P EST MOD 30 MIN: CPT | Mod: 25

## 2024-07-25 PROCEDURE — 93000 ELECTROCARDIOGRAM COMPLETE: CPT

## 2024-07-25 RX ORDER — ROSUVASTATIN CALCIUM 20 MG/1
20 TABLET, FILM COATED ORAL DAILY
Refills: 0 | Status: ACTIVE | COMMUNITY

## 2024-07-25 RX ORDER — ONDANSETRON 4 MG/1
4 TABLET ORAL
Refills: 0 | Status: ACTIVE | COMMUNITY

## 2024-07-25 RX ORDER — ENALAPRIL MALEATE 5 MG/1
TABLET ORAL DAILY
Refills: 0 | Status: ACTIVE | COMMUNITY

## 2024-07-25 RX ORDER — SEMAGLUTIDE 1.34 MG/ML
2 INJECTION, SOLUTION SUBCUTANEOUS WEEKLY
Refills: 0 | Status: ACTIVE | COMMUNITY

## 2024-07-25 RX ORDER — VENLAFAXINE HYDROCHLORIDE 150 MG/1
150 CAPSULE, EXTENDED RELEASE ORAL DAILY
Refills: 0 | Status: ACTIVE | COMMUNITY

## 2024-07-25 RX ORDER — CARIPRAZINE 3 MG/1
3 CAPSULE, GELATIN COATED ORAL DAILY
Refills: 0 | Status: ACTIVE | COMMUNITY

## 2024-07-25 RX ORDER — OMEPRAZOLE 20 MG/1
20 TABLET, DELAYED RELEASE ORAL DAILY
Refills: 0 | Status: ACTIVE | COMMUNITY

## 2024-07-25 NOTE — HISTORY OF PRESENT ILLNESS
[FreeTextEntry1] : The patient is a 58-year-old white male with a past medical history remarkable for hypertension, hypercholesterolemia, carotid artery disease, and obesity. The patient does not exercise but reports that he is active at work.  He is chest pain and dyspnea free.  Recent laboratory results remain unavailable.  His cholesterol from 4/24/2023 was acceptable.

## 2024-07-25 NOTE — CARDIOLOGY SUMMARY
[de-identified] : Normal sinus rhythm [de-identified] : - Hypertension - Hypercholesterolemia - obesity: BMI 33 - NUCLEAR STRESS TEST: 6/20/2023, normal, EF>65%, below average exercise tolerance  - ECHOCARDIOGRAM: 6/22/2023, septal hypertrophy,EF 60%, mild mitral regurgitation, trace tricuspid regurgitation RVSP 25  - CAROTID ULTRASOUND: 6/22/2023, nonobstructive disease

## 2024-07-25 NOTE — DISCUSSION/SUMMARY
[FreeTextEntry1] : Hypertension Well-controlled Continue medical therapy   Hypercholesterolemia  At target 4/2023 More recent laboratory results requested   carotid artery disease Nonobstructive. Continue aspirin and statin therapy  Obesity/below average exercise tolerance We rediscussed diet, exercise and weight loss  RTO 1 year [EKG obtained to assist in diagnosis and management of assessed problem(s)] : EKG obtained to assist in diagnosis and management of assessed problem(s)

## 2024-12-18 NOTE — CONSULT NOTE ADULT - NSCONSULTADDITIONALINFOA_GEN_ALL_CORE
Patient oriented to room and ED throughput process.  Safety measures with ED bed locked in lowest position and call light in reach.  Patient educated on all orders, including any medications.  Patient educated on chief complaint/symptoms. Patient encouraged to ask questions regarding care, medications or treatment plan.  Patient aware of how to reach staff with questions/concerns.    Rx Written	Rx Dispensed	Drug	Quantity	Days Supply	Prescriber Name  11/16/2022	12/10/2022	oxycodone-acetaminophen 5-325 mg tab	90	30	Pancho Garay  11/16/2022	11/17/2022	pregabalin 75 mg capsule	90	30	Pancho Garay  11/08/2022	11/10/2022	oxycodone-acetaminophen 5-325 mg tab	90	30	Pancho Garay  10/04/2022	10/06/2022	oxycodone-acetaminophen 5-325 mg tab	90	30	Pancho Garay  08/31/2022	09/01/2022	oxycodone-acetaminophen 5-325 mg tab	90	30

## 2025-07-22 ENCOUNTER — APPOINTMENT (OUTPATIENT)
Dept: CARDIOLOGY | Facility: CLINIC | Age: 60
End: 2025-07-22

## (undated) DEVICE — DRAIN RESERVOIR FOR JACKSON PRATT 100CC CARDINAL

## (undated) DEVICE — MIDAS REX LEGEND MATCH HEAD FLUTED SM BORE 3.0MM X 10CM

## (undated) DEVICE — DRAPE C ARM UNIVERSAL

## (undated) DEVICE — ELCTR GROUNDING PAD ADULT COVIDIEN

## (undated) DEVICE — DRAPE TOWEL BLUE 17" X 24"

## (undated) DEVICE — DRAIN JACKSON PRATT 7MM FLAT FULL W 15 FR TROCAR

## (undated) DEVICE — DRAPE XL SHEET 77X98"

## (undated) DEVICE — DRAPE 1/2 SHEET 40X57"

## (undated) DEVICE — DRSG TELFA 4 X 8

## (undated) DEVICE — DRAPE 3/4 SHEET 52X76"

## (undated) DEVICE — DRSG STERISTRIPS 0.5 X 4"

## (undated) DEVICE — FRAZIER CONNECTING TUBE 2FT 5MM

## (undated) DEVICE — SUT MONOCRYL 3-0 27" PS-2 UNDYED

## (undated) DEVICE — ELCTR BOVIE TIP BLADE INSULATED 6.5" EDGE

## (undated) DEVICE — MARKING PEN W RULER

## (undated) DEVICE — DRAPE INSTRUMENT POUCH 6.75" X 11"

## (undated) DEVICE — DRAPE TOWEL 1000 SMALL 17" X 11"

## (undated) DEVICE — DRSG DERMABOND 0.7ML

## (undated) DEVICE — DRAPE SPLIT SHEET 77" X 108"

## (undated) DEVICE — SUT VICRYL 0 18" CT-1 UNDYED (POP-OFF)

## (undated) DEVICE — SUT VICRYL 2-0 18" CT-2 (POP-OFF)

## (undated) DEVICE — PREP DURAPREP 26CC

## (undated) DEVICE — ELCTR PEDICLE SCREW PROBE 3MM BALL 1.8MM X 100MM

## (undated) DEVICE — DRSG XEROFORM 5 X 9"

## (undated) DEVICE — SUT ETHILON 3-0 18" PS-1

## (undated) DEVICE — POSITIONER JACKSON TABLE HEADREST 7", CHEST, HIP, THIGH PADS, ARM CRADLE

## (undated) DEVICE — GLV 8 PROTEXIS (WHITE)

## (undated) DEVICE — DRSG MEPILEX 10 X 25CM (4 X 10") POST OP AG SILVER

## (undated) DEVICE — TUBING BIPOLAR IRRIGATOR AND CORD SET